# Patient Record
Sex: FEMALE | Race: OTHER | URBAN - METROPOLITAN AREA
[De-identification: names, ages, dates, MRNs, and addresses within clinical notes are randomized per-mention and may not be internally consistent; named-entity substitution may affect disease eponyms.]

---

## 2020-01-10 LAB
ALBUMIN SERPL-MCNC: NORMAL G/DL
ALP BLD-CCNC: NORMAL U/L
ALT SERPL-CCNC: NORMAL U/L
ANION GAP SERPL CALCULATED.3IONS-SCNC: NORMAL MMOL/L
AST SERPL-CCNC: NORMAL U/L
BILIRUB SERPL-MCNC: NORMAL MG/DL
BUN BLDV-MCNC: NORMAL MG/DL
CALCIUM SERPL-MCNC: NORMAL MG/DL
CHLORIDE BLD-SCNC: NORMAL MMOL/L
CHOLESTEROL, TOTAL: NORMAL
CHOLESTEROL/HDL RATIO: NORMAL
CO2: NORMAL
CREAT SERPL-MCNC: NORMAL MG/DL
GFR CALCULATED: NORMAL
GLUCOSE BLD-MCNC: NORMAL MG/DL
HDLC SERPL-MCNC: NORMAL MG/DL
LDL CHOLESTEROL CALCULATED: NORMAL
NONHDLC SERPL-MCNC: NORMAL MG/DL
POTASSIUM SERPL-SCNC: NORMAL MMOL/L
SODIUM BLD-SCNC: NORMAL MMOL/L
TOTAL PROTEIN: NORMAL
TRIGL SERPL-MCNC: NORMAL MG/DL
TSH SERPL DL<=0.05 MIU/L-ACNC: NORMAL M[IU]/L
VITAMIN D 25-HYDROXY: NORMAL
VITAMIN D2, 25 HYDROXY: NORMAL
VITAMIN D3,25 HYDROXY: NORMAL
VLDLC SERPL CALC-MCNC: NORMAL MG/DL

## 2020-10-09 LAB — PAP SMEAR, EXTERNAL: NORMAL

## 2022-09-19 RX ORDER — ETONOGESTREL AND ETHINYL ESTRADIOL 11.7; 2.7 MG/1; MG/1
1 INSERT, EXTENDED RELEASE VAGINAL SEE ADMIN INSTRUCTIONS
COMMUNITY
End: 2022-09-27

## 2022-09-19 RX ORDER — IBUPROFEN 800 MG/1
800 TABLET ORAL 3 TIMES DAILY PRN
COMMUNITY

## 2022-09-25 PROBLEM — F90.2 ATTENTION DEFICIT HYPERACTIVITY DISORDER (ADHD), COMBINED TYPE: Status: ACTIVE | Noted: 2022-09-25

## 2022-09-26 NOTE — PROGRESS NOTES
Tonia Lambert presents today for follow up of ADHD    Current Outpatient Medications   Medication Sig Dispense Refill    amphetamine-dextroamphetamine (ADDERALL, 20MG,) 20 MG tablet Take 1 tablet by mouth in the morning and 1 tablet in the evening. Do all this for 30 days. 60 tablet 0    ibuprofen (ADVIL;MOTRIN) 800 MG tablet Take 800 mg by mouth 3 times daily as needed for Pain       No current facility-administered medications for this visit. Past Medical History:   Diagnosis Date    ADD (attention deficit disorder)           Subjective:  AS above. Feels great, very busy, stays focused is able to multitask and complete everything. No side effects. Review of Systems   Constitutional:  Negative for activity change, appetite change and chills. HENT:  Negative for congestion, ear pain, mouth sores, postnasal drip, rhinorrhea, sinus pressure, sneezing, sore throat and trouble swallowing. Eyes:  Negative for visual disturbance. Cardiovascular:  Negative for chest pain, palpitations and leg swelling. Gastrointestinal:  Negative for abdominal distention, abdominal pain, blood in stool, constipation, diarrhea, nausea and vomiting. Endocrine: Negative for cold intolerance, heat intolerance, polydipsia and polyuria. Genitourinary:  Negative for difficulty urinating, dysuria, flank pain, frequency and urgency. Musculoskeletal:  Negative for arthralgias, back pain, gait problem, neck pain and neck stiffness. Skin: Negative. Negative for color change. Allergic/Immunologic: Negative. Neurological:  Negative for dizziness, tremors, speech difficulty, weakness, light-headedness and headaches. Hematological: Negative. Psychiatric/Behavioral: Negative.           Objective:  /80 (Site: Left Upper Arm, Position: Sitting, Cuff Size: Medium Adult)   Temp 97.7 °F (36.5 °C)   Ht 5' (1.524 m)   Wt 132 lb (59.9 kg)   LMP 09/14/2022 (Exact Date)   BMI 25.78 kg/m²      Physical Exam  HENT: Head: Normocephalic. Right Ear: Tympanic membrane and external ear normal. There is no impacted cerumen. Left Ear: Tympanic membrane and external ear normal. There is no impacted cerumen. Nose: Nose normal.      Mouth/Throat:      Pharynx: Oropharynx is clear. No oropharyngeal exudate. Eyes:      Extraocular Movements: Extraocular movements intact. Conjunctiva/sclera: Conjunctivae normal.      Pupils: Pupils are equal, round, and reactive to light. Cardiovascular:      Rate and Rhythm: Normal rate and regular rhythm. Heart sounds: No murmur heard. No friction rub. No gallop. Pulmonary:      Effort: Pulmonary effort is normal.      Breath sounds: Normal breath sounds. Abdominal:      General: Bowel sounds are normal. There is no distension. Palpations: Abdomen is soft. There is no mass. Tenderness: There is no abdominal tenderness. There is no guarding or rebound. Musculoskeletal:         General: No swelling, tenderness or deformity. Normal range of motion. Cervical back: Normal range of motion and neck supple. No tenderness. Lymphadenopathy:      Cervical: No cervical adenopathy. Skin:     General: Skin is warm. Coloration: Skin is not pale. Findings: No rash. Neurological:      General: No focal deficit present. Mental Status: She is alert and oriented to person, place, and time. Assessment:  Roland Adam was seen today for adhd. Diagnoses and all orders for this visit:    Attention deficit hyperactivity disorder (ADHD), unspecified ADHD type  Comments:  Stable on Adderall   Orders:  -     amphetamine-dextroamphetamine (ADDERALL, 20MG,) 20 MG tablet; Take 1 tablet by mouth in the morning and 1 tablet in the evening. Do all this for 30 days.

## 2022-09-27 ENCOUNTER — OFFICE VISIT (OUTPATIENT)
Dept: PRIMARY CARE CLINIC | Age: 37
End: 2022-09-27
Payer: COMMERCIAL

## 2022-09-27 VITALS
SYSTOLIC BLOOD PRESSURE: 118 MMHG | DIASTOLIC BLOOD PRESSURE: 80 MMHG | HEIGHT: 60 IN | TEMPERATURE: 97.7 F | BODY MASS INDEX: 25.91 KG/M2 | WEIGHT: 132 LBS

## 2022-09-27 DIAGNOSIS — F90.9 ATTENTION DEFICIT HYPERACTIVITY DISORDER (ADHD), UNSPECIFIED ADHD TYPE: Primary | ICD-10-CM

## 2022-09-27 PROCEDURE — 99213 OFFICE O/P EST LOW 20 MIN: CPT | Performed by: INTERNAL MEDICINE

## 2022-09-27 RX ORDER — DEXTROAMPHETAMINE SACCHARATE, AMPHETAMINE ASPARTATE, DEXTROAMPHETAMINE SULFATE AND AMPHETAMINE SULFATE 5; 5; 5; 5 MG/1; MG/1; MG/1; MG/1
20 TABLET ORAL 2 TIMES DAILY
Qty: 60 TABLET | Refills: 0 | Status: SHIPPED | OUTPATIENT
Start: 2022-09-27 | End: 2022-10-27

## 2022-09-27 SDOH — ECONOMIC STABILITY: FOOD INSECURITY: WITHIN THE PAST 12 MONTHS, YOU WORRIED THAT YOUR FOOD WOULD RUN OUT BEFORE YOU GOT MONEY TO BUY MORE.: NEVER TRUE

## 2022-09-27 SDOH — ECONOMIC STABILITY: FOOD INSECURITY: WITHIN THE PAST 12 MONTHS, THE FOOD YOU BOUGHT JUST DIDN'T LAST AND YOU DIDN'T HAVE MONEY TO GET MORE.: NEVER TRUE

## 2022-09-27 ASSESSMENT — ENCOUNTER SYMPTOMS
BLOOD IN STOOL: 0
COLOR CHANGE: 0
BACK PAIN: 0
RHINORRHEA: 0
ABDOMINAL PAIN: 0
NAUSEA: 0
ALLERGIC/IMMUNOLOGIC NEGATIVE: 1
SINUS PRESSURE: 0
SORE THROAT: 0
CONSTIPATION: 0
DIARRHEA: 0
TROUBLE SWALLOWING: 0
VOMITING: 0
ABDOMINAL DISTENTION: 0

## 2022-09-27 ASSESSMENT — PATIENT HEALTH QUESTIONNAIRE - PHQ9
SUM OF ALL RESPONSES TO PHQ QUESTIONS 1-9: 0
SUM OF ALL RESPONSES TO PHQ QUESTIONS 1-9: 0
1. LITTLE INTEREST OR PLEASURE IN DOING THINGS: 0
SUM OF ALL RESPONSES TO PHQ QUESTIONS 1-9: 0
SUM OF ALL RESPONSES TO PHQ9 QUESTIONS 1 & 2: 0
2. FEELING DOWN, DEPRESSED OR HOPELESS: 0
SUM OF ALL RESPONSES TO PHQ QUESTIONS 1-9: 0

## 2022-09-27 ASSESSMENT — SOCIAL DETERMINANTS OF HEALTH (SDOH): HOW HARD IS IT FOR YOU TO PAY FOR THE VERY BASICS LIKE FOOD, HOUSING, MEDICAL CARE, AND HEATING?: NOT HARD AT ALL

## 2022-11-11 ENCOUNTER — OFFICE VISIT (OUTPATIENT)
Dept: PRIMARY CARE CLINIC | Age: 37
End: 2022-11-11
Payer: COMMERCIAL

## 2022-11-11 VITALS
BODY MASS INDEX: 25.91 KG/M2 | OXYGEN SATURATION: 98 % | HEIGHT: 60 IN | SYSTOLIC BLOOD PRESSURE: 102 MMHG | DIASTOLIC BLOOD PRESSURE: 68 MMHG | WEIGHT: 132 LBS | TEMPERATURE: 98 F | HEART RATE: 81 BPM

## 2022-11-11 DIAGNOSIS — F90.9 ATTENTION DEFICIT HYPERACTIVITY DISORDER (ADHD), UNSPECIFIED ADHD TYPE: ICD-10-CM

## 2022-11-11 PROCEDURE — G8427 DOCREV CUR MEDS BY ELIG CLIN: HCPCS | Performed by: INTERNAL MEDICINE

## 2022-11-11 PROCEDURE — 99213 OFFICE O/P EST LOW 20 MIN: CPT | Performed by: INTERNAL MEDICINE

## 2022-11-11 PROCEDURE — G8484 FLU IMMUNIZE NO ADMIN: HCPCS | Performed by: INTERNAL MEDICINE

## 2022-11-11 PROCEDURE — G8419 CALC BMI OUT NRM PARAM NOF/U: HCPCS | Performed by: INTERNAL MEDICINE

## 2022-11-11 PROCEDURE — 1036F TOBACCO NON-USER: CPT | Performed by: INTERNAL MEDICINE

## 2022-11-11 RX ORDER — IBUPROFEN 800 MG/1
800 TABLET ORAL 3 TIMES DAILY PRN
Qty: 120 TABLET | Refills: 0 | Status: SHIPPED | OUTPATIENT
Start: 2022-11-11

## 2022-11-11 RX ORDER — DEXTROAMPHETAMINE SACCHARATE, AMPHETAMINE ASPARTATE, DEXTROAMPHETAMINE SULFATE AND AMPHETAMINE SULFATE 5; 5; 5; 5 MG/1; MG/1; MG/1; MG/1
20 TABLET ORAL 2 TIMES DAILY
Qty: 60 TABLET | Refills: 0 | Status: SHIPPED | OUTPATIENT
Start: 2022-11-11 | End: 2022-12-11

## 2022-11-14 ASSESSMENT — ENCOUNTER SYMPTOMS
SINUS PRESSURE: 0
DIARRHEA: 0
ABDOMINAL PAIN: 0
COLOR CHANGE: 0
NAUSEA: 0
ALLERGIC/IMMUNOLOGIC NEGATIVE: 1
TROUBLE SWALLOWING: 0
SORE THROAT: 0
ABDOMINAL DISTENTION: 0
VOMITING: 0
BACK PAIN: 0
CONSTIPATION: 0
BLOOD IN STOOL: 0
RHINORRHEA: 0

## 2022-11-14 NOTE — PROGRESS NOTES
Maria G White presents today for follow up of ADHD    Current Outpatient Medications   Medication Sig Dispense Refill    amphetamine-dextroamphetamine (ADDERALL, 20MG,) 20 MG tablet Take 1 tablet by mouth in the morning and 1 tablet in the evening. Do all this for 30 days. 60 tablet 0    ibuprofen (ADVIL;MOTRIN) 800 MG tablet Take 1 tablet by mouth 3 times daily as needed for Pain 120 tablet 0     No current facility-administered medications for this visit. Past Medical History:   Diagnosis Date    ADD (attention deficit disorder)           Subjective:  AS above. Continues very busy, has a couple of jobs, though not long term, feels content for now. Still has to decide what to do next. Takes Adderall twice a day every day. No side effects. Allows her to stay focus, multi task and complete tasks without anxiety. Would like to continue on same dose. ADHD  Pertinent negatives include no abdominal pain, arthralgias, chest pain, chills, congestion, headaches, nausea, neck pain, sore throat, vomiting or weakness. Ankle Pain     Knee Pain      Review of Systems   Constitutional:  Negative for activity change, appetite change and chills. HENT:  Negative for congestion, ear pain, mouth sores, postnasal drip, rhinorrhea, sinus pressure, sneezing, sore throat and trouble swallowing. Eyes:  Negative for visual disturbance. Cardiovascular:  Negative for chest pain, palpitations and leg swelling. Gastrointestinal:  Negative for abdominal distention, abdominal pain, blood in stool, constipation, diarrhea, nausea and vomiting. Endocrine: Negative for cold intolerance, heat intolerance, polydipsia and polyuria. Genitourinary:  Negative for difficulty urinating, dysuria, flank pain, frequency and urgency. Musculoskeletal:  Negative for arthralgias, back pain, gait problem, neck pain and neck stiffness. Skin: Negative. Negative for color change. Allergic/Immunologic: Negative.     Neurological: Negative for dizziness, tremors, speech difficulty, weakness, light-headedness and headaches. Hematological: Negative. Psychiatric/Behavioral: Negative. Objective:  /68 (Site: Left Upper Arm, Position: Sitting, Cuff Size: Medium Adult)   Pulse 81   Temp 98 °F (36.7 °C)   Ht 5' (1.524 m)   Wt 132 lb (59.9 kg)   LMP 11/05/2022 (Exact Date)   SpO2 98%   BMI 25.78 kg/m²      Physical Exam  HENT:      Head: Normocephalic. Right Ear: Tympanic membrane and external ear normal. There is no impacted cerumen. Left Ear: Tympanic membrane and external ear normal. There is no impacted cerumen. Nose: Nose normal.      Mouth/Throat:      Pharynx: Oropharynx is clear. No oropharyngeal exudate. Eyes:      Extraocular Movements: Extraocular movements intact. Conjunctiva/sclera: Conjunctivae normal.      Pupils: Pupils are equal, round, and reactive to light. Cardiovascular:      Rate and Rhythm: Normal rate and regular rhythm. Heart sounds: No murmur heard. No friction rub. No gallop. Pulmonary:      Effort: Pulmonary effort is normal.      Breath sounds: Normal breath sounds. Abdominal:      General: Bowel sounds are normal. There is no distension. Palpations: Abdomen is soft. There is no mass. Tenderness: There is no abdominal tenderness. There is no guarding or rebound. Musculoskeletal:         General: No swelling, tenderness or deformity. Normal range of motion. Cervical back: Normal range of motion and neck supple. No tenderness. Lymphadenopathy:      Cervical: No cervical adenopathy. Skin:     General: Skin is warm. Coloration: Skin is not pale. Findings: No rash. Neurological:      General: No focal deficit present. Mental Status: She is alert and oriented to person, place, and time. Assessment:  Magui Ha was seen today for adhd, ankle pain and knee pain.     Diagnoses and all orders for this visit:    Attention deficit hyperactivity disorder (ADHD), unspecified ADHD type  Comments:  Stable on Adderall with no side effects  Orders:  -     amphetamine-dextroamphetamine (ADDERALL, 20MG,) 20 MG tablet; Take 1 tablet by mouth in the morning and 1 tablet in the evening. Do all this for 30 days. Other orders  -     ibuprofen (ADVIL;MOTRIN) 800 MG tablet;  Take 1 tablet by mouth 3 times daily as needed for Pain

## 2022-12-05 ENCOUNTER — TELEPHONE (OUTPATIENT)
Dept: PRIMARY CARE CLINIC | Age: 37
End: 2022-12-05

## 2022-12-05 DIAGNOSIS — J06.9 URTI (ACUTE UPPER RESPIRATORY INFECTION): Primary | ICD-10-CM

## 2022-12-05 RX ORDER — AZITHROMYCIN 1 G
1 PACKET (EA) ORAL ONCE
Qty: 1 EACH | Refills: 0 | Status: SHIPPED | OUTPATIENT
Start: 2022-12-05 | End: 2022-12-05

## 2022-12-05 RX ORDER — AZITHROMYCIN 1 G
1 PACKET (EA) ORAL ONCE
COMMUNITY
End: 2022-12-05 | Stop reason: SDUPTHER

## 2022-12-09 ENCOUNTER — OFFICE VISIT (OUTPATIENT)
Dept: PRIMARY CARE CLINIC | Age: 37
End: 2022-12-09
Payer: COMMERCIAL

## 2022-12-09 VITALS
BODY MASS INDEX: 25.72 KG/M2 | DIASTOLIC BLOOD PRESSURE: 68 MMHG | WEIGHT: 131 LBS | HEIGHT: 60 IN | TEMPERATURE: 98.6 F | OXYGEN SATURATION: 99 % | HEART RATE: 80 BPM | SYSTOLIC BLOOD PRESSURE: 112 MMHG

## 2022-12-09 DIAGNOSIS — F90.9 ATTENTION DEFICIT HYPERACTIVITY DISORDER (ADHD), UNSPECIFIED ADHD TYPE: Primary | ICD-10-CM

## 2022-12-09 DIAGNOSIS — J06.9 URTI (ACUTE UPPER RESPIRATORY INFECTION): ICD-10-CM

## 2022-12-09 DIAGNOSIS — L03.032 INFECTION OF NAIL BED OF TOE OF LEFT FOOT: ICD-10-CM

## 2022-12-09 PROCEDURE — 1036F TOBACCO NON-USER: CPT | Performed by: INTERNAL MEDICINE

## 2022-12-09 PROCEDURE — G8419 CALC BMI OUT NRM PARAM NOF/U: HCPCS | Performed by: INTERNAL MEDICINE

## 2022-12-09 PROCEDURE — G8484 FLU IMMUNIZE NO ADMIN: HCPCS | Performed by: INTERNAL MEDICINE

## 2022-12-09 PROCEDURE — 99214 OFFICE O/P EST MOD 30 MIN: CPT | Performed by: INTERNAL MEDICINE

## 2022-12-09 PROCEDURE — G8427 DOCREV CUR MEDS BY ELIG CLIN: HCPCS | Performed by: INTERNAL MEDICINE

## 2022-12-09 RX ORDER — DEXTROAMPHETAMINE SACCHARATE, AMPHETAMINE ASPARTATE, DEXTROAMPHETAMINE SULFATE AND AMPHETAMINE SULFATE 5; 5; 5; 5 MG/1; MG/1; MG/1; MG/1
20 TABLET ORAL 2 TIMES DAILY
Qty: 60 TABLET | Refills: 0 | Status: SHIPPED | OUTPATIENT
Start: 2022-12-09 | End: 2023-01-08

## 2022-12-09 RX ORDER — CEPHALEXIN 250 MG/1
250 CAPSULE ORAL 3 TIMES DAILY
Qty: 21 CAPSULE | Refills: 0 | Status: SHIPPED | OUTPATIENT
Start: 2022-12-09

## 2022-12-09 ASSESSMENT — ENCOUNTER SYMPTOMS
SORE THROAT: 1
BLOOD IN STOOL: 0
NAUSEA: 0
ALLERGIC/IMMUNOLOGIC NEGATIVE: 1
ABDOMINAL DISTENTION: 0
COLOR CHANGE: 0
ABDOMINAL PAIN: 0
SINUS PRESSURE: 0
DIARRHEA: 0
TROUBLE SWALLOWING: 0
CONSTIPATION: 0
VOMITING: 0
RHINORRHEA: 0
BACK PAIN: 0

## 2022-12-09 NOTE — PROGRESS NOTES
Lovina Claude presents today for follow of URTI, thinks has a toe infection and ADHD    Current Outpatient Medications   Medication Sig Dispense Refill    amphetamine-dextroamphetamine (ADDERALL, 20MG,) 20 MG tablet Take 1 tablet by mouth in the morning and 1 tablet in the evening. Do all this for 30 days. 60 tablet 0    cephALEXin (KEFLEX) 250 MG capsule Take 1 capsule by mouth 3 times daily 21 capsule 0    ibuprofen (ADVIL;MOTRIN) 800 MG tablet Take 1 tablet by mouth 3 times daily as needed for Pain 120 tablet 0     No current facility-administered medications for this visit. Past Medical History:   Diagnosis Date    ADD (attention deficit disorder)           Subjective:  As above. Takes Adderall every day, no side effects. Functions great, able to multitask and stay focused. Sore throat, just took Zithromax, starting to feel better, wants  her throat checked. No fever or cough, checked herself for COVID, neg. Pulled shin next to her big toe nail, now is swollen and seems infected. ADHD  Associated symptoms include a sore throat. Pertinent negatives include no abdominal pain, arthralgias, chest pain, chills, congestion, headaches, nausea, neck pain, vomiting or weakness. Foot Swelling      Review of Systems   Constitutional:  Negative for activity change, appetite change and chills. HENT:  Positive for sore throat. Negative for congestion, ear pain, mouth sores, postnasal drip, rhinorrhea, sinus pressure, sneezing and trouble swallowing. Eyes:  Negative for visual disturbance. Cardiovascular:  Negative for chest pain, palpitations and leg swelling. Gastrointestinal:  Negative for abdominal distention, abdominal pain, blood in stool, constipation, diarrhea, nausea and vomiting. Endocrine: Negative for cold intolerance, heat intolerance, polydipsia and polyuria. Genitourinary:  Negative for difficulty urinating, dysuria, flank pain, frequency and urgency.    Musculoskeletal:  Negative for arthralgias, back pain, gait problem, neck pain and neck stiffness. Painful left big toe. Skin: Negative. Negative for color change. Allergic/Immunologic: Negative. Neurological:  Negative for dizziness, tremors, speech difficulty, weakness, light-headedness and headaches. Hematological: Negative. Psychiatric/Behavioral: Negative. Objective:  /68 (Site: Left Upper Arm, Position: Sitting, Cuff Size: Medium Adult)   Pulse 80   Temp 98.6 °F (37 °C)   Ht 5' (1.524 m)   Wt 131 lb (59.4 kg)   LMP 12/01/2022 (Exact Date)   SpO2 99%   BMI 25.58 kg/m²      Physical Exam     Assessment:  Odilon Lopez was seen today for adhd and foot swelling. Diagnoses and all orders for this visit:    Attention deficit hyperactivity disorder (ADHD), unspecified ADHD type  Comments:  Stable on Adderall with no side effects  Orders:  -     amphetamine-dextroamphetamine (ADDERALL, 20MG,) 20 MG tablet; Take 1 tablet by mouth in the morning and 1 tablet in the evening. Do all this for 30 days. URTI (acute upper respiratory infection)  Comments:  REsponing to Zithromax    Infection of nail bed of toe of left foot  Comments:  Soak foot in hot water and Peroxide, Keflex 200 tid for 7 days    Other orders  -     cephALEXin (KEFLEX) 250 MG capsule;  Take 1 capsule by mouth 3 times daily

## 2023-01-17 ENCOUNTER — OFFICE VISIT (OUTPATIENT)
Dept: PRIMARY CARE CLINIC | Age: 38
End: 2023-01-17
Payer: COMMERCIAL

## 2023-01-17 VITALS
TEMPERATURE: 99.1 F | BODY MASS INDEX: 26.31 KG/M2 | HEART RATE: 92 BPM | OXYGEN SATURATION: 98 % | HEIGHT: 60 IN | DIASTOLIC BLOOD PRESSURE: 70 MMHG | WEIGHT: 134 LBS | SYSTOLIC BLOOD PRESSURE: 104 MMHG

## 2023-01-17 DIAGNOSIS — F90.9 ATTENTION DEFICIT HYPERACTIVITY DISORDER (ADHD), UNSPECIFIED ADHD TYPE: ICD-10-CM

## 2023-01-17 PROCEDURE — G8484 FLU IMMUNIZE NO ADMIN: HCPCS | Performed by: INTERNAL MEDICINE

## 2023-01-17 PROCEDURE — G8419 CALC BMI OUT NRM PARAM NOF/U: HCPCS | Performed by: INTERNAL MEDICINE

## 2023-01-17 PROCEDURE — 1036F TOBACCO NON-USER: CPT | Performed by: INTERNAL MEDICINE

## 2023-01-17 PROCEDURE — 99213 OFFICE O/P EST LOW 20 MIN: CPT | Performed by: INTERNAL MEDICINE

## 2023-01-17 PROCEDURE — G8427 DOCREV CUR MEDS BY ELIG CLIN: HCPCS | Performed by: INTERNAL MEDICINE

## 2023-01-17 RX ORDER — DEXTROAMPHETAMINE SACCHARATE, AMPHETAMINE ASPARTATE, DEXTROAMPHETAMINE SULFATE AND AMPHETAMINE SULFATE 5; 5; 5; 5 MG/1; MG/1; MG/1; MG/1
20 TABLET ORAL 2 TIMES DAILY
Qty: 60 TABLET | Refills: 0 | Status: SHIPPED | OUTPATIENT
Start: 2023-01-17 | End: 2023-02-16

## 2023-01-17 ASSESSMENT — PATIENT HEALTH QUESTIONNAIRE - PHQ9
1. LITTLE INTEREST OR PLEASURE IN DOING THINGS: 0
SUM OF ALL RESPONSES TO PHQ QUESTIONS 1-9: 0
SUM OF ALL RESPONSES TO PHQ9 QUESTIONS 1 & 2: 0
SUM OF ALL RESPONSES TO PHQ QUESTIONS 1-9: 0
SUM OF ALL RESPONSES TO PHQ QUESTIONS 1-9: 0
2. FEELING DOWN, DEPRESSED OR HOPELESS: 0
SUM OF ALL RESPONSES TO PHQ QUESTIONS 1-9: 0

## 2023-01-17 ASSESSMENT — ENCOUNTER SYMPTOMS
TROUBLE SWALLOWING: 0
ABDOMINAL PAIN: 0
ABDOMINAL DISTENTION: 0
NAUSEA: 0
BLOOD IN STOOL: 0
CONSTIPATION: 0
COLOR CHANGE: 0
SORE THROAT: 0
BACK PAIN: 0
ALLERGIC/IMMUNOLOGIC NEGATIVE: 1
SINUS PRESSURE: 0
DIARRHEA: 0
RHINORRHEA: 0
VOMITING: 0

## 2023-01-17 NOTE — PROGRESS NOTES
Jennifer Maddox presents today for follow up of ADHD    Current Outpatient Medications   Medication Sig Dispense Refill    amphetamine-dextroamphetamine (ADDERALL, 20MG,) 20 MG tablet Take 1 tablet by mouth in the morning and 1 tablet in the evening. Do all this for 30 days. 60 tablet 0    ibuprofen (ADVIL;MOTRIN) 800 MG tablet Take 1 tablet by mouth 3 times daily as needed for Pain 120 tablet 0    cephALEXin (KEFLEX) 250 MG capsule Take 1 capsule by mouth 3 times daily (Patient not taking: Reported on 1/17/2023) 21 capsule 0     No current facility-administered medications for this visit. Past Medical History:   Diagnosis Date    ADD (attention deficit disorder)           Subjective:  AS above, takes Adderall religiously twice a day. Tolerates well. As long as she takes it is able to focus, multitask, and complete tasks. ADHD  Pertinent negatives include no abdominal pain, arthralgias, chest pain, chills, congestion, headaches, nausea, neck pain, sore throat, vomiting or weakness. Review of Systems   Constitutional:  Negative for activity change, appetite change and chills. HENT:  Negative for congestion, ear pain, mouth sores, postnasal drip, rhinorrhea, sinus pressure, sneezing, sore throat and trouble swallowing. Eyes:  Negative for visual disturbance. Cardiovascular:  Negative for chest pain, palpitations and leg swelling. Gastrointestinal:  Negative for abdominal distention, abdominal pain, blood in stool, constipation, diarrhea, nausea and vomiting. Endocrine: Negative for cold intolerance, heat intolerance, polydipsia and polyuria. Genitourinary:  Negative for difficulty urinating, dysuria, flank pain, frequency and urgency. Musculoskeletal:  Negative for arthralgias, back pain, gait problem, neck pain and neck stiffness. Skin: Negative. Negative for color change. Allergic/Immunologic: Negative.     Neurological:  Negative for dizziness, tremors, speech difficulty, weakness, light-headedness and headaches. Hematological: Negative. Psychiatric/Behavioral: Negative. Objective:  /70 (Site: Left Upper Arm, Position: Sitting, Cuff Size: Medium Adult)   Pulse 92   Temp 99.1 °F (37.3 °C)   Ht 5' (1.524 m)   Wt 134 lb (60.8 kg)   LMP 12/22/2022 (Approximate)   SpO2 98%   BMI 26.17 kg/m²      Physical Exam  HENT:      Head: Normocephalic. Right Ear: Tympanic membrane and external ear normal. There is no impacted cerumen. Left Ear: Tympanic membrane and external ear normal. There is no impacted cerumen. Nose: Nose normal.      Mouth/Throat:      Pharynx: Oropharynx is clear. No oropharyngeal exudate. Eyes:      Extraocular Movements: Extraocular movements intact. Conjunctiva/sclera: Conjunctivae normal.      Pupils: Pupils are equal, round, and reactive to light. Cardiovascular:      Rate and Rhythm: Normal rate and regular rhythm. Heart sounds: No murmur heard. No friction rub. No gallop. Pulmonary:      Effort: Pulmonary effort is normal.      Breath sounds: Normal breath sounds. Abdominal:      General: Bowel sounds are normal. There is no distension. Palpations: Abdomen is soft. There is no mass. Tenderness: There is no abdominal tenderness. There is no guarding or rebound. Musculoskeletal:         General: No swelling, tenderness or deformity. Normal range of motion. Cervical back: Normal range of motion and neck supple. No tenderness. Lymphadenopathy:      Cervical: No cervical adenopathy. Skin:     General: Skin is warm. Coloration: Skin is not pale. Findings: No rash. Neurological:      General: No focal deficit present. Mental Status: She is alert and oriented to person, place, and time. Psychiatric:      Comments: Hyperactivity, lack of focus        Assessment:  Kristin Stratton was seen today for adhd.     Diagnoses and all orders for this visit:    Attention deficit hyperactivity disorder (ADHD), unspecified ADHD type  Comments:  Stable on Adderall with no side effects  Orders:  -     amphetamine-dextroamphetamine (ADDERALL, 20MG,) 20 MG tablet; Take 1 tablet by mouth in the morning and 1 tablet in the evening. Do all this for 30 days.

## 2023-01-27 ENCOUNTER — TELEPHONE (OUTPATIENT)
Dept: PRIMARY CARE CLINIC | Age: 38
End: 2023-01-27

## 2023-01-27 NOTE — TELEPHONE ENCOUNTER
Patient requesting something be sent into [facility] for a ear infection. States her ear is throbbing and hurts. She would like a return call to let her know if something is called in. Thank you.

## 2023-01-29 RX ORDER — AMOXICILLIN AND CLAVULANATE POTASSIUM 875; 125 MG/1; MG/1
1 TABLET, FILM COATED ORAL 2 TIMES DAILY WITH MEALS
Qty: 20 TABLET | Refills: 0 | Status: SHIPPED | OUTPATIENT
Start: 2023-01-29 | End: 2023-02-08

## 2023-02-14 ENCOUNTER — OFFICE VISIT (OUTPATIENT)
Dept: PRIMARY CARE CLINIC | Age: 38
End: 2023-02-14
Payer: COMMERCIAL

## 2023-02-14 VITALS
HEIGHT: 60 IN | HEART RATE: 78 BPM | SYSTOLIC BLOOD PRESSURE: 118 MMHG | DIASTOLIC BLOOD PRESSURE: 78 MMHG | TEMPERATURE: 98 F | WEIGHT: 132 LBS | BODY MASS INDEX: 25.91 KG/M2 | OXYGEN SATURATION: 98 %

## 2023-02-14 DIAGNOSIS — F90.9 ATTENTION DEFICIT HYPERACTIVITY DISORDER (ADHD), UNSPECIFIED ADHD TYPE: ICD-10-CM

## 2023-02-14 DIAGNOSIS — H92.02 OTALGIA OF LEFT EAR: Primary | ICD-10-CM

## 2023-02-14 PROCEDURE — G8427 DOCREV CUR MEDS BY ELIG CLIN: HCPCS | Performed by: INTERNAL MEDICINE

## 2023-02-14 PROCEDURE — 99213 OFFICE O/P EST LOW 20 MIN: CPT | Performed by: INTERNAL MEDICINE

## 2023-02-14 PROCEDURE — 1036F TOBACCO NON-USER: CPT | Performed by: INTERNAL MEDICINE

## 2023-02-14 PROCEDURE — G8419 CALC BMI OUT NRM PARAM NOF/U: HCPCS | Performed by: INTERNAL MEDICINE

## 2023-02-14 PROCEDURE — G8484 FLU IMMUNIZE NO ADMIN: HCPCS | Performed by: INTERNAL MEDICINE

## 2023-02-14 RX ORDER — IBUPROFEN 800 MG/1
800 TABLET ORAL 3 TIMES DAILY PRN
Qty: 120 TABLET | Refills: 0 | Status: SHIPPED | OUTPATIENT
Start: 2023-02-14

## 2023-02-14 RX ORDER — DEXTROAMPHETAMINE SACCHARATE, AMPHETAMINE ASPARTATE, DEXTROAMPHETAMINE SULFATE AND AMPHETAMINE SULFATE 5; 5; 5; 5 MG/1; MG/1; MG/1; MG/1
20 TABLET ORAL 2 TIMES DAILY
Qty: 60 TABLET | Refills: 0 | Status: SHIPPED | OUTPATIENT
Start: 2023-02-14 | End: 2023-03-16

## 2023-02-14 SDOH — ECONOMIC STABILITY: FOOD INSECURITY: WITHIN THE PAST 12 MONTHS, YOU WORRIED THAT YOUR FOOD WOULD RUN OUT BEFORE YOU GOT MONEY TO BUY MORE.: NEVER TRUE

## 2023-02-14 SDOH — ECONOMIC STABILITY: INCOME INSECURITY: HOW HARD IS IT FOR YOU TO PAY FOR THE VERY BASICS LIKE FOOD, HOUSING, MEDICAL CARE, AND HEATING?: NOT HARD AT ALL

## 2023-02-14 SDOH — ECONOMIC STABILITY: FOOD INSECURITY: WITHIN THE PAST 12 MONTHS, THE FOOD YOU BOUGHT JUST DIDN'T LAST AND YOU DIDN'T HAVE MONEY TO GET MORE.: NEVER TRUE

## 2023-02-14 SDOH — ECONOMIC STABILITY: HOUSING INSECURITY
IN THE LAST 12 MONTHS, WAS THERE A TIME WHEN YOU DID NOT HAVE A STEADY PLACE TO SLEEP OR SLEPT IN A SHELTER (INCLUDING NOW)?: NO

## 2023-02-14 ASSESSMENT — ENCOUNTER SYMPTOMS
BACK PAIN: 0
VOMITING: 0
RHINORRHEA: 0
ABDOMINAL DISTENTION: 0
ABDOMINAL PAIN: 0
DIARRHEA: 0
TROUBLE SWALLOWING: 0
BLOOD IN STOOL: 0
SINUS PRESSURE: 0
ALLERGIC/IMMUNOLOGIC NEGATIVE: 1
CONSTIPATION: 0
NAUSEA: 0
SORE THROAT: 0
COLOR CHANGE: 0

## 2023-02-14 NOTE — PROGRESS NOTES
Terrell Minor presents today for follow up of ADHD and left ear ache. Current Outpatient Medications   Medication Sig Dispense Refill    amphetamine-dextroamphetamine (ADDERALL, 20MG,) 20 MG tablet Take 1 tablet by mouth in the morning and 1 tablet in the evening. Do all this for 30 days. 60 tablet 0    ibuprofen (ADVIL;MOTRIN) 800 MG tablet Take 1 tablet by mouth 3 times daily as needed for Pain 120 tablet 0    cephALEXin (KEFLEX) 250 MG capsule Take 1 capsule by mouth 3 times daily (Patient not taking: No sig reported) 21 capsule 0     No current facility-administered medications for this visit. Past Medical History:   Diagnosis Date    ADD (attention deficit disorder)           Subjective:  ADHD very well controlled on Adderall bid, takes it every day, able to focus and multitask without issues. No side effects. [facility], given Augmentin, called in with persistent pain, was given Augmentin again, still very mild residual pain/discomfort but is better. Had tubes in both ears long time ago. Has constant nasal drainage and stuffiness. Always on allergy pills and other otc meds for it. No nasal sprays. ADHD  Pertinent negatives include no abdominal pain, arthralgias, chest pain, chills, congestion, headaches, nausea, neck pain, sore throat, vomiting or weakness. Review of Systems   Constitutional:  Negative for activity change, appetite change and chills. HENT:  Positive for ear pain. Negative for congestion, mouth sores, postnasal drip, rhinorrhea, sinus pressure, sneezing, sore throat and trouble swallowing. Eyes:  Negative for visual disturbance. Cardiovascular:  Negative for chest pain, palpitations and leg swelling. Gastrointestinal:  Negative for abdominal distention, abdominal pain, blood in stool, constipation, diarrhea, nausea and vomiting. Endocrine: Negative for cold intolerance, heat intolerance, polydipsia and polyuria.    Genitourinary: Negative for difficulty urinating, dysuria, flank pain, frequency and urgency. Musculoskeletal:  Negative for arthralgias, back pain, gait problem, neck pain and neck stiffness. Skin: Negative. Negative for color change. Allergic/Immunologic: Negative. Neurological:  Negative for dizziness, tremors, speech difficulty, weakness, light-headedness and headaches. Hematological: Negative. Psychiatric/Behavioral: Negative. Objective:  /78 (Site: Left Upper Arm, Position: Sitting, Cuff Size: Medium Adult)   Pulse 78   Temp 98 °F (36.7 °C)   Ht 5' (1.524 m)   Wt 132 lb (59.9 kg)   LMP 02/12/2023   SpO2 98%   BMI 25.78 kg/m²      Physical Exam  Vitals reviewed. Constitutional:       Appearance: Normal appearance. HENT:      Head: Normocephalic. Right Ear: Tympanic membrane and external ear normal. There is no impacted cerumen. Left Ear: Tympanic membrane and external ear normal. There is no impacted cerumen. Ears:      Comments: Bilateral ear scar tissue, no swelling or redness, membrane is clear. Nose: Nose normal.      Mouth/Throat:      Pharynx: Oropharynx is clear. No oropharyngeal exudate. Eyes:      Extraocular Movements: Extraocular movements intact. Conjunctiva/sclera: Conjunctivae normal.      Pupils: Pupils are equal, round, and reactive to light. Cardiovascular:      Rate and Rhythm: Normal rate and regular rhythm. Heart sounds: No murmur heard. No friction rub. No gallop. Pulmonary:      Effort: Pulmonary effort is normal.      Breath sounds: Normal breath sounds. Abdominal:      General: Bowel sounds are normal. There is no distension. Palpations: Abdomen is soft. There is no mass. Tenderness: There is no abdominal tenderness. There is no guarding or rebound. Musculoskeletal:         General: No swelling, tenderness or deformity. Normal range of motion. Cervical back: Normal range of motion and neck supple.  No tenderness. Lymphadenopathy:      Cervical: No cervical adenopathy. Skin:     General: Skin is warm. Coloration: Skin is not pale. Findings: No rash. Neurological:      General: No focal deficit present. Mental Status: She is alert and oriented to person, place, and time. Assessment:  Terrie Arthur was seen today for adhd. Diagnoses and all orders for this visit:    Otalgia of left ear  Comments:  No signs of infection, finish antibiotic, call if not 110% resolved. Attention deficit hyperactivity disorder (ADHD), unspecified ADHD type  Comments:  Stable on Adderall with no side effects  Orders:  -     amphetamine-dextroamphetamine (ADDERALL, 20MG,) 20 MG tablet; Take 1 tablet by mouth in the morning and 1 tablet in the evening. Do all this for 30 days. Other orders  -     ibuprofen (ADVIL;MOTRIN) 800 MG tablet;  Take 1 tablet by mouth 3 times daily as needed for Pain

## 2023-03-14 ENCOUNTER — OFFICE VISIT (OUTPATIENT)
Dept: PRIMARY CARE CLINIC | Age: 38
End: 2023-03-14
Payer: COMMERCIAL

## 2023-03-14 VITALS
HEIGHT: 60 IN | BODY MASS INDEX: 26.5 KG/M2 | OXYGEN SATURATION: 97 % | WEIGHT: 135 LBS | HEART RATE: 91 BPM | DIASTOLIC BLOOD PRESSURE: 70 MMHG | SYSTOLIC BLOOD PRESSURE: 116 MMHG

## 2023-03-14 DIAGNOSIS — F90.9 ATTENTION DEFICIT HYPERACTIVITY DISORDER (ADHD), UNSPECIFIED ADHD TYPE: ICD-10-CM

## 2023-03-14 PROCEDURE — 99213 OFFICE O/P EST LOW 20 MIN: CPT | Performed by: INTERNAL MEDICINE

## 2023-03-14 PROCEDURE — G8419 CALC BMI OUT NRM PARAM NOF/U: HCPCS | Performed by: INTERNAL MEDICINE

## 2023-03-14 PROCEDURE — 1036F TOBACCO NON-USER: CPT | Performed by: INTERNAL MEDICINE

## 2023-03-14 PROCEDURE — G8427 DOCREV CUR MEDS BY ELIG CLIN: HCPCS | Performed by: INTERNAL MEDICINE

## 2023-03-14 PROCEDURE — G8484 FLU IMMUNIZE NO ADMIN: HCPCS | Performed by: INTERNAL MEDICINE

## 2023-03-14 RX ORDER — DEXTROAMPHETAMINE SACCHARATE, AMPHETAMINE ASPARTATE, DEXTROAMPHETAMINE SULFATE AND AMPHETAMINE SULFATE 5; 5; 5; 5 MG/1; MG/1; MG/1; MG/1
20 TABLET ORAL 2 TIMES DAILY
Qty: 60 TABLET | Refills: 0 | Status: CANCELLED | OUTPATIENT
Start: 2023-03-14 | End: 2023-04-13

## 2023-03-14 RX ORDER — CLINDAMYCIN PHOSPHATE 10 MG/G
1 GEL TOPICAL DAILY
COMMUNITY
Start: 2023-02-18

## 2023-03-14 RX ORDER — BENZOYL PEROXIDE 50 MG/ML
1 LIQUID TOPICAL DAILY
COMMUNITY
Start: 2023-02-20

## 2023-03-14 ASSESSMENT — ENCOUNTER SYMPTOMS
SORE THROAT: 0
COLOR CHANGE: 0
CONSTIPATION: 0
TROUBLE SWALLOWING: 0
DIARRHEA: 0
ALLERGIC/IMMUNOLOGIC NEGATIVE: 1
ABDOMINAL PAIN: 0
SINUS PRESSURE: 0
BLOOD IN STOOL: 0
NAUSEA: 0
RHINORRHEA: 0
BACK PAIN: 0
VOMITING: 0
ABDOMINAL DISTENTION: 0

## 2023-03-14 NOTE — PROGRESS NOTES
Sreekanth Jolly presents today for   Follow up of ADHD  Current Outpatient Medications   Medication Sig Dispense Refill    BENZOYL PEROXIDE 5 % external wash Apply 1 application topically daily      clindamycin (CLINDAGEL) 1 % gel Apply 1 application topically daily      amphetamine-dextroamphetamine (ADDERALL, 20MG,) 20 MG tablet Take 1 tablet by mouth in the morning and 1 tablet in the evening. Do all this for 30 days. 60 tablet 0    ibuprofen (ADVIL;MOTRIN) 800 MG tablet Take 1 tablet by mouth 3 times daily as needed for Pain 120 tablet 0    cephALEXin (KEFLEX) 250 MG capsule Take 1 capsule by mouth 3 times daily (Patient not taking: No sig reported) 21 capsule 0     No current facility-administered medications for this visit. Past Medical History:   Diagnosis Date    ADD (attention deficit disorder)           Subjective:  Pharmacy has been out of Adderall so was not able to get last month's prescription. Feels a great difference. Can't focus, can't sleep, forgetful, feels like her brain is all over the place. ADHD  Pertinent negatives include no abdominal pain, arthralgias, chest pain, chills, congestion, headaches, nausea, neck pain, sore throat, vomiting or weakness. Review of Systems   Constitutional:  Negative for activity change, appetite change and chills. HENT:  Negative for congestion, ear pain, mouth sores, postnasal drip, rhinorrhea, sinus pressure, sneezing, sore throat and trouble swallowing. Eyes:  Negative for visual disturbance. Cardiovascular:  Negative for chest pain, palpitations and leg swelling. Gastrointestinal:  Negative for abdominal distention, abdominal pain, blood in stool, constipation, diarrhea, nausea and vomiting. Endocrine: Negative for cold intolerance, heat intolerance, polydipsia and polyuria. Genitourinary:  Positive for menstrual problem. Negative for difficulty urinating, dysuria, flank pain, frequency and urgency.    Musculoskeletal:  Negative for arthralgias, back pain, gait problem, neck pain and neck stiffness. Skin: Negative. Negative for color change. Allergic/Immunologic: Negative. Neurological:  Negative for dizziness, tremors, speech difficulty, weakness, light-headedness and headaches. Hematological: Negative. Psychiatric/Behavioral: Negative. Inatentive, lack of focus, insomnia        Objective:  /70 (Site: Left Upper Arm, Position: Sitting, Cuff Size: Medium Adult)   Pulse 91   Ht 5' (1.524 m)   Wt 135 lb (61.2 kg)   LMP 03/07/2023 (Exact Date)   SpO2 97%   BMI 26.37 kg/m²      Physical Exam  Constitutional:       Comments: Looks tired   HENT:      Head: Normocephalic. Right Ear: Tympanic membrane and external ear normal. There is no impacted cerumen. Left Ear: Tympanic membrane and external ear normal. There is no impacted cerumen. Nose: Nose normal.      Mouth/Throat:      Pharynx: Oropharynx is clear. No oropharyngeal exudate. Eyes:      Extraocular Movements: Extraocular movements intact. Conjunctiva/sclera: Conjunctivae normal.      Pupils: Pupils are equal, round, and reactive to light. Cardiovascular:      Rate and Rhythm: Normal rate and regular rhythm. Heart sounds: No murmur heard. No friction rub. No gallop. Pulmonary:      Effort: Pulmonary effort is normal.      Breath sounds: Normal breath sounds. Abdominal:      General: Bowel sounds are normal. There is no distension. Palpations: Abdomen is soft. There is no mass. Tenderness: There is no abdominal tenderness. There is no guarding or rebound. Musculoskeletal:         General: No swelling, tenderness or deformity. Normal range of motion. Cervical back: Normal range of motion and neck supple. No tenderness. Lymphadenopathy:      Cervical: No cervical adenopathy. Skin:     General: Skin is warm. Coloration: Skin is not pale. Findings: No rash.    Neurological:      General: No focal deficit present. Mental Status: She is alert and oriented to person, place, and time. Assessment:  Letty Blanco was seen today for adhd. Diagnoses and all orders for this visit:    Attention deficit hyperactivity disorder (ADHD), unspecified ADHD type  Comments:  Has not have Adderall for a month, all sxs of ADHD are back.   Gave written prescription instead of sending it electronically so she can go to several pharmacies

## 2023-04-04 NOTE — TELEPHONE ENCOUNTER
Pt requested antibiotic for her toe. She cut it too short and it is red, painful, and leaking pus. She has done this before and was told to soak it- and received an antibiotic for it.   Please send to [facility] [phone]

## 2023-04-05 RX ORDER — CEPHALEXIN 250 MG/1
250 CAPSULE ORAL 3 TIMES DAILY
Qty: 21 CAPSULE | Refills: 0 | Status: SHIPPED | OUTPATIENT
Start: 2023-04-05 | End: 2023-04-12

## 2023-05-16 ENCOUNTER — OFFICE VISIT (OUTPATIENT)
Dept: PRIMARY CARE CLINIC | Age: 38
End: 2023-05-16
Payer: COMMERCIAL

## 2023-05-16 VITALS
OXYGEN SATURATION: 98 % | WEIGHT: 131 LBS | HEIGHT: 60 IN | SYSTOLIC BLOOD PRESSURE: 112 MMHG | HEART RATE: 82 BPM | DIASTOLIC BLOOD PRESSURE: 76 MMHG | TEMPERATURE: 98 F | BODY MASS INDEX: 25.72 KG/M2

## 2023-05-16 DIAGNOSIS — J30.9 ALLERGIC RHINITIS, UNSPECIFIED SEASONALITY, UNSPECIFIED TRIGGER: Primary | ICD-10-CM

## 2023-05-16 DIAGNOSIS — F90.9 ATTENTION DEFICIT HYPERACTIVITY DISORDER (ADHD), UNSPECIFIED ADHD TYPE: ICD-10-CM

## 2023-05-16 PROCEDURE — 1036F TOBACCO NON-USER: CPT | Performed by: INTERNAL MEDICINE

## 2023-05-16 PROCEDURE — G8419 CALC BMI OUT NRM PARAM NOF/U: HCPCS | Performed by: INTERNAL MEDICINE

## 2023-05-16 PROCEDURE — 99213 OFFICE O/P EST LOW 20 MIN: CPT | Performed by: INTERNAL MEDICINE

## 2023-05-16 PROCEDURE — G8427 DOCREV CUR MEDS BY ELIG CLIN: HCPCS | Performed by: INTERNAL MEDICINE

## 2023-05-16 RX ORDER — LORATADINE 10 MG/1
10 TABLET ORAL DAILY
Qty: 30 TABLET | Refills: 2 | Status: SHIPPED | OUTPATIENT
Start: 2023-05-16

## 2023-05-16 RX ORDER — IBUPROFEN 800 MG/1
800 TABLET ORAL 3 TIMES DAILY PRN
Qty: 120 TABLET | Refills: 0 | Status: SHIPPED | OUTPATIENT
Start: 2023-05-16

## 2023-05-16 RX ORDER — DEXTROAMPHETAMINE SACCHARATE, AMPHETAMINE ASPARTATE, DEXTROAMPHETAMINE SULFATE AND AMPHETAMINE SULFATE 5; 5; 5; 5 MG/1; MG/1; MG/1; MG/1
20 TABLET ORAL 2 TIMES DAILY
Qty: 60 TABLET | Refills: 0 | Status: SHIPPED | OUTPATIENT
Start: 2023-05-16 | End: 2023-06-15

## 2023-05-16 ASSESSMENT — ENCOUNTER SYMPTOMS
COLOR CHANGE: 0
NAUSEA: 0
ABDOMINAL DISTENTION: 0
BLOOD IN STOOL: 0
SORE THROAT: 0
TROUBLE SWALLOWING: 0
BACK PAIN: 0
ALLERGIC/IMMUNOLOGIC NEGATIVE: 1
DIARRHEA: 0
VOMITING: 0
SINUS PRESSURE: 0
RHINORRHEA: 0
ABDOMINAL PAIN: 0
CONSTIPATION: 0

## 2023-05-16 NOTE — PROGRESS NOTES
Soren Lofton presents today for     Current Outpatient Medications   Medication Sig Dispense Refill    amphetamine-dextroamphetamine (ADDERALL, 20MG,) 20 MG tablet Take 1 tablet by mouth in the morning and 1 tablet in the evening. Do all this for 30 days. 60 tablet 0    BENZOYL PEROXIDE 5 % external wash Apply 1 application topically daily      clindamycin (CLINDAGEL) 1 % gel Apply 1 application topically daily      ibuprofen (ADVIL;MOTRIN) 800 MG tablet Take 1 tablet by mouth 3 times daily as needed for Pain 120 tablet 0     No current facility-administered medications for this visit. Past Medical History:   Diagnosis Date    ADD (attention deficit disorder)           Subjective:  As above. Overall doing well on Adderall, good attention span, able to focus. Having allergy sxs, sneezing, teary eyes. ADHD  Pertinent negatives include no abdominal pain, arthralgias, chest pain, chills, congestion, headaches, nausea, neck pain, sore throat, vomiting or weakness. Review of Systems   Constitutional:  Negative for activity change, appetite change and chills. HENT:  Positive for postnasal drip and sneezing. Negative for congestion, ear pain, mouth sores, rhinorrhea, sinus pressure, sore throat and trouble swallowing. Eyes:  Negative for visual disturbance. Cardiovascular:  Negative for chest pain, palpitations and leg swelling. Gastrointestinal:  Negative for abdominal distention, abdominal pain, blood in stool, constipation, diarrhea, nausea and vomiting. Endocrine: Negative for cold intolerance, heat intolerance, polydipsia and polyuria. Genitourinary:  Negative for difficulty urinating, dysuria, flank pain, frequency and urgency. Musculoskeletal:  Negative for arthralgias, back pain, gait problem, neck pain and neck stiffness. Skin: Negative. Negative for color change. Allergic/Immunologic: Negative.     Neurological:  Negative for dizziness, tremors, speech difficulty, weakness,

## 2023-06-13 RX ORDER — IBUPROFEN 800 MG/1
TABLET ORAL
Qty: 90 TABLET | Refills: 1 | OUTPATIENT
Start: 2023-06-13

## 2023-06-27 ENCOUNTER — OFFICE VISIT (OUTPATIENT)
Dept: PRIMARY CARE CLINIC | Age: 38
End: 2023-06-27
Payer: COMMERCIAL

## 2023-06-27 VITALS
HEART RATE: 67 BPM | TEMPERATURE: 97.5 F | HEIGHT: 60 IN | BODY MASS INDEX: 25.72 KG/M2 | SYSTOLIC BLOOD PRESSURE: 118 MMHG | OXYGEN SATURATION: 94 % | DIASTOLIC BLOOD PRESSURE: 78 MMHG | WEIGHT: 131 LBS

## 2023-06-27 DIAGNOSIS — F90.9 ATTENTION DEFICIT HYPERACTIVITY DISORDER (ADHD), UNSPECIFIED ADHD TYPE: ICD-10-CM

## 2023-06-27 DIAGNOSIS — R20.2 PARESTHESIA OF BOTH HANDS: Primary | ICD-10-CM

## 2023-06-27 PROCEDURE — G8419 CALC BMI OUT NRM PARAM NOF/U: HCPCS | Performed by: INTERNAL MEDICINE

## 2023-06-27 PROCEDURE — G8427 DOCREV CUR MEDS BY ELIG CLIN: HCPCS | Performed by: INTERNAL MEDICINE

## 2023-06-27 PROCEDURE — 99213 OFFICE O/P EST LOW 20 MIN: CPT | Performed by: INTERNAL MEDICINE

## 2023-06-27 PROCEDURE — 1036F TOBACCO NON-USER: CPT | Performed by: INTERNAL MEDICINE

## 2023-06-27 RX ORDER — DEXTROAMPHETAMINE SACCHARATE, AMPHETAMINE ASPARTATE, DEXTROAMPHETAMINE SULFATE AND AMPHETAMINE SULFATE 5; 5; 5; 5 MG/1; MG/1; MG/1; MG/1
20 TABLET ORAL 2 TIMES DAILY
Qty: 60 TABLET | Refills: 0 | Status: SHIPPED | OUTPATIENT
Start: 2023-06-27 | End: 2023-07-27

## 2023-06-27 ASSESSMENT — ENCOUNTER SYMPTOMS
BACK PAIN: 0
RHINORRHEA: 0
ABDOMINAL DISTENTION: 0
NAUSEA: 0
TROUBLE SWALLOWING: 0
SINUS PRESSURE: 0
CONSTIPATION: 0
COLOR CHANGE: 0
VOMITING: 0
BLOOD IN STOOL: 0
ALLERGIC/IMMUNOLOGIC NEGATIVE: 1
SORE THROAT: 0
ABDOMINAL PAIN: 0
DIARRHEA: 0

## 2023-07-17 ENCOUNTER — TELEPHONE (OUTPATIENT)
Dept: PRIMARY CARE CLINIC | Age: 38
End: 2023-07-17

## 2023-07-17 RX ORDER — FLUCONAZOLE 150 MG/1
150 TABLET ORAL ONCE
Qty: 1 TABLET | Refills: 0 | Status: SHIPPED | OUTPATIENT
Start: 2023-07-17 | End: 2023-07-17

## 2023-07-17 NOTE — TELEPHONE ENCOUNTER
Patient requesting something be sent into [facility] on [address]. For a yeast infection. Thank you.

## 2023-07-25 ENCOUNTER — OFFICE VISIT (OUTPATIENT)
Dept: PRIMARY CARE CLINIC | Age: 38
End: 2023-07-25
Payer: COMMERCIAL

## 2023-07-25 VITALS
HEIGHT: 60 IN | OXYGEN SATURATION: 99 % | DIASTOLIC BLOOD PRESSURE: 62 MMHG | WEIGHT: 132 LBS | SYSTOLIC BLOOD PRESSURE: 102 MMHG | TEMPERATURE: 97.8 F | BODY MASS INDEX: 25.91 KG/M2 | HEART RATE: 81 BPM

## 2023-07-25 DIAGNOSIS — F90.9 ATTENTION DEFICIT HYPERACTIVITY DISORDER (ADHD), UNSPECIFIED ADHD TYPE: ICD-10-CM

## 2023-07-25 PROCEDURE — G8427 DOCREV CUR MEDS BY ELIG CLIN: HCPCS | Performed by: INTERNAL MEDICINE

## 2023-07-25 PROCEDURE — G8419 CALC BMI OUT NRM PARAM NOF/U: HCPCS | Performed by: INTERNAL MEDICINE

## 2023-07-25 PROCEDURE — 99213 OFFICE O/P EST LOW 20 MIN: CPT | Performed by: INTERNAL MEDICINE

## 2023-07-25 PROCEDURE — 1036F TOBACCO NON-USER: CPT | Performed by: INTERNAL MEDICINE

## 2023-07-25 RX ORDER — DEXTROAMPHETAMINE SACCHARATE, AMPHETAMINE ASPARTATE, DEXTROAMPHETAMINE SULFATE AND AMPHETAMINE SULFATE 5; 5; 5; 5 MG/1; MG/1; MG/1; MG/1
20 TABLET ORAL 2 TIMES DAILY
Qty: 60 TABLET | Refills: 0 | Status: SHIPPED | OUTPATIENT
Start: 2023-07-25 | End: 2023-08-24

## 2023-07-25 ASSESSMENT — ENCOUNTER SYMPTOMS
NAUSEA: 0
TROUBLE SWALLOWING: 0
BLOOD IN STOOL: 0
SINUS PRESSURE: 0
ABDOMINAL DISTENTION: 0
RHINORRHEA: 1
CONSTIPATION: 0
COLOR CHANGE: 0
SORE THROAT: 0
VOMITING: 0
ABDOMINAL PAIN: 0
BACK PAIN: 0
DIARRHEA: 0
ALLERGIC/IMMUNOLOGIC NEGATIVE: 1

## 2023-07-25 NOTE — PROGRESS NOTES
Scarlet Peterson presents today for   Follow up of ADD and allergies  Current Outpatient Medications   Medication Sig Dispense Refill    amphetamine-dextroamphetamine (ADDERALL, 20MG,) 20 MG tablet Take 1 tablet by mouth in the morning and 1 tablet in the evening. Do all this for 30 days. 60 tablet 0    ibuprofen (ADVIL;MOTRIN) 800 MG tablet Take 1 tablet by mouth 3 times daily as needed for Pain 120 tablet 0    loratadine (CLARITIN) 10 MG tablet Take 1 tablet by mouth daily 30 tablet 2    BENZOYL PEROXIDE 5 % external wash Apply 1 application topically daily      clindamycin (CLINDAGEL) 1 % gel Apply 1 application topically daily       No current facility-administered medications for this visit. Past Medical History:   Diagnosis Date    ADD (attention deficit disorder)           Subjective:  AS above. Continues to take Adderall as directed, helps her stay focused and complete tasks without feeling overwhelm. Claritin is helping her allergy sxs. Review of Systems   Constitutional:  Negative for activity change, appetite change and chills. HENT:  Positive for rhinorrhea. Negative for congestion, ear pain, mouth sores, postnasal drip, sinus pressure, sneezing, sore throat and trouble swallowing. Eyes:  Negative for visual disturbance. Cardiovascular:  Negative for chest pain, palpitations and leg swelling. Gastrointestinal:  Negative for abdominal distention, abdominal pain, blood in stool, constipation, diarrhea, nausea and vomiting. Endocrine: Negative for cold intolerance, heat intolerance, polydipsia and polyuria. Genitourinary:  Negative for difficulty urinating, dysuria, flank pain, frequency and urgency. Musculoskeletal:  Negative for arthralgias, back pain, gait problem, neck pain and neck stiffness. Skin: Negative. Negative for color change. Allergic/Immunologic: Negative.     Neurological:  Negative for dizziness, tremors, speech difficulty, weakness, light-headedness and

## 2023-08-14 ENCOUNTER — TELEPHONE (OUTPATIENT)
Dept: PRIMARY CARE CLINIC | Age: 38
End: 2023-08-14

## 2023-08-14 RX ORDER — CEPHALEXIN 250 MG/1
250 CAPSULE ORAL 4 TIMES DAILY
Qty: 28 CAPSULE | Refills: 0 | Status: SHIPPED | OUTPATIENT
Start: 2023-08-14

## 2023-08-14 NOTE — TELEPHONE ENCOUNTER
[facility] [address]. Patients states she smashed her finger and pulled off the nail and now her finger is swollen and throbbing. Thank you.

## 2023-08-22 ENCOUNTER — OFFICE VISIT (OUTPATIENT)
Dept: PRIMARY CARE CLINIC | Age: 38
End: 2023-08-22
Payer: COMMERCIAL

## 2023-08-22 VITALS
TEMPERATURE: 98.4 F | SYSTOLIC BLOOD PRESSURE: 110 MMHG | OXYGEN SATURATION: 98 % | HEIGHT: 60 IN | DIASTOLIC BLOOD PRESSURE: 72 MMHG | BODY MASS INDEX: 25.91 KG/M2 | HEART RATE: 58 BPM | WEIGHT: 132 LBS

## 2023-08-22 DIAGNOSIS — J30.1 SEASONAL ALLERGIC RHINITIS DUE TO POLLEN: ICD-10-CM

## 2023-08-22 DIAGNOSIS — F90.9 ATTENTION DEFICIT HYPERACTIVITY DISORDER (ADHD), UNSPECIFIED ADHD TYPE: ICD-10-CM

## 2023-08-22 DIAGNOSIS — L03.011 INFECTION OF NAIL BED OF FINGER OF RIGHT HAND: Primary | ICD-10-CM

## 2023-08-22 PROCEDURE — 1036F TOBACCO NON-USER: CPT | Performed by: INTERNAL MEDICINE

## 2023-08-22 PROCEDURE — G8419 CALC BMI OUT NRM PARAM NOF/U: HCPCS | Performed by: INTERNAL MEDICINE

## 2023-08-22 PROCEDURE — 99213 OFFICE O/P EST LOW 20 MIN: CPT | Performed by: INTERNAL MEDICINE

## 2023-08-22 PROCEDURE — G8427 DOCREV CUR MEDS BY ELIG CLIN: HCPCS | Performed by: INTERNAL MEDICINE

## 2023-08-22 RX ORDER — LORATADINE 10 MG/1
10 TABLET ORAL DAILY
Qty: 90 TABLET | Refills: 0 | Status: SHIPPED | OUTPATIENT
Start: 2023-08-22

## 2023-08-22 RX ORDER — DEXTROAMPHETAMINE SACCHARATE, AMPHETAMINE ASPARTATE, DEXTROAMPHETAMINE SULFATE AND AMPHETAMINE SULFATE 5; 5; 5; 5 MG/1; MG/1; MG/1; MG/1
20 TABLET ORAL 2 TIMES DAILY
Qty: 60 TABLET | Refills: 0 | Status: SHIPPED | OUTPATIENT
Start: 2023-08-22 | End: 2023-09-21

## 2023-08-22 ASSESSMENT — ENCOUNTER SYMPTOMS
COLOR CHANGE: 0
ABDOMINAL DISTENTION: 0
RHINORRHEA: 1
ALLERGIC/IMMUNOLOGIC NEGATIVE: 1
SORE THROAT: 0
BACK PAIN: 0
NAUSEA: 0
TROUBLE SWALLOWING: 0
SINUS PRESSURE: 0
CONSTIPATION: 0
VOMITING: 0
ABDOMINAL PAIN: 0
DIARRHEA: 0
BLOOD IN STOOL: 0

## 2023-08-22 NOTE — PROGRESS NOTES
Cervical: No cervical adenopathy. Skin:     General: Skin is warm. Coloration: Skin is not pale. Findings: No rash. Neurological:      General: No focal deficit present. Mental Status: She is alert and oriented to person, place, and time. Assessment:  Joss Perry was seen today for adhd. Diagnoses and all orders for this visit:    Infection of nail bed of finger of right hand  Comments:  Resolved after a week of Keflex, no further tx given. Attention deficit hyperactivity disorder (ADHD), unspecified ADHD type  Comments:  Stable on Adderall with no side effects, cont same, prescribed one month with no rf. Orders:  -     amphetamine-dextroamphetamine (ADDERALL, 20MG,) 20 MG tablet; Take 1 tablet by mouth in the morning and 1 tablet in the evening. Do all this for 30 days. Seasonal allergic rhinitis due to pollen  Comments:  Prescribed Zyrtec 10 qd    Other orders  -     loratadine (CLARITIN) 10 MG tablet;  Take 1 tablet by mouth daily

## 2023-09-19 ENCOUNTER — OFFICE VISIT (OUTPATIENT)
Dept: PRIMARY CARE CLINIC | Age: 38
End: 2023-09-19
Payer: COMMERCIAL

## 2023-09-19 VITALS
RESPIRATION RATE: 16 BRPM | WEIGHT: 130 LBS | TEMPERATURE: 97.6 F | HEIGHT: 60 IN | HEART RATE: 68 BPM | BODY MASS INDEX: 25.52 KG/M2 | OXYGEN SATURATION: 97 % | DIASTOLIC BLOOD PRESSURE: 70 MMHG | SYSTOLIC BLOOD PRESSURE: 120 MMHG

## 2023-09-19 DIAGNOSIS — F90.9 ATTENTION DEFICIT HYPERACTIVITY DISORDER (ADHD), UNSPECIFIED ADHD TYPE: ICD-10-CM

## 2023-09-19 DIAGNOSIS — S66.912A MUSCLE STRAIN OF LEFT WRIST, INITIAL ENCOUNTER: Primary | ICD-10-CM

## 2023-09-19 PROCEDURE — 99213 OFFICE O/P EST LOW 20 MIN: CPT | Performed by: INTERNAL MEDICINE

## 2023-09-19 PROCEDURE — 1036F TOBACCO NON-USER: CPT | Performed by: INTERNAL MEDICINE

## 2023-09-19 PROCEDURE — G8427 DOCREV CUR MEDS BY ELIG CLIN: HCPCS | Performed by: INTERNAL MEDICINE

## 2023-09-19 PROCEDURE — G8419 CALC BMI OUT NRM PARAM NOF/U: HCPCS | Performed by: INTERNAL MEDICINE

## 2023-09-19 RX ORDER — IBUPROFEN 800 MG/1
800 TABLET ORAL 3 TIMES DAILY PRN
Qty: 120 TABLET | Refills: 0 | Status: SHIPPED | OUTPATIENT
Start: 2023-09-19

## 2023-09-19 RX ORDER — DEXTROAMPHETAMINE SACCHARATE, AMPHETAMINE ASPARTATE, DEXTROAMPHETAMINE SULFATE AND AMPHETAMINE SULFATE 5; 5; 5; 5 MG/1; MG/1; MG/1; MG/1
20 TABLET ORAL 2 TIMES DAILY
Qty: 60 TABLET | Refills: 0 | Status: SHIPPED | OUTPATIENT
Start: 2023-09-19 | End: 2023-10-19

## 2023-09-19 NOTE — PROGRESS NOTES
Odette Toussaint presents today for follow up of ADHD, also requesting refill on Ibuprofen for off and on left wrist pain/discomfort. Current Outpatient Medications   Medication Sig Dispense Refill    amphetamine-dextroamphetamine (ADDERALL, 20MG,) 20 MG tablet Take 1 tablet by mouth in the morning and 1 tablet in the evening. Do all this for 30 days. 60 tablet 0    ibuprofen (ADVIL;MOTRIN) 800 MG tablet Take 1 tablet by mouth 3 times daily as needed for Pain 120 tablet 0    loratadine (CLARITIN) 10 MG tablet Take 1 tablet by mouth daily 90 tablet 0    cephALEXin (KEFLEX) 250 MG capsule Take 1 capsule by mouth 4 times daily 28 capsule 0    BENZOYL PEROXIDE 5 % external wash Apply 1 application topically daily      clindamycin (CLINDAGEL) 1 % gel Apply 1 application topically daily       No current facility-administered medications for this visit. Past Medical History:   Diagnosis Date    ADD (attention deficit disorder)           Subjective:  AS above. Doing great on Adderall, takes it every day twice a day. Able to stay on tasks and complete them, able to multi task. No side effects. Still has occ pain left wrist, thinks related to work, uses left hand a lot. Would like refill on Ibuprofen. Review of Systems   Constitutional:  Negative for activity change, appetite change and chills. HENT:  Negative for congestion, ear pain, mouth sores, postnasal drip, rhinorrhea, sinus pressure, sneezing, sore throat and trouble swallowing. Eyes:  Negative for visual disturbance. Cardiovascular:  Negative for chest pain, palpitations and leg swelling. Gastrointestinal:  Negative for abdominal distention, abdominal pain, blood in stool, constipation, diarrhea, nausea and vomiting. Endocrine: Negative for cold intolerance, heat intolerance, polydipsia and polyuria. Genitourinary:  Negative for difficulty urinating, dysuria, flank pain, frequency and urgency.    Musculoskeletal:  Positive for

## 2023-09-20 ASSESSMENT — ENCOUNTER SYMPTOMS
SORE THROAT: 0
ALLERGIC/IMMUNOLOGIC NEGATIVE: 1
ABDOMINAL PAIN: 0
ABDOMINAL DISTENTION: 0
CONSTIPATION: 0
SINUS PRESSURE: 0
BACK PAIN: 0
DIARRHEA: 0
BLOOD IN STOOL: 0
NAUSEA: 0
TROUBLE SWALLOWING: 0
VOMITING: 0
COLOR CHANGE: 0
RHINORRHEA: 0

## 2023-10-10 ENCOUNTER — TELEPHONE (OUTPATIENT)
Dept: PRIMARY CARE CLINIC | Age: 38
End: 2023-10-10

## 2023-10-10 NOTE — TELEPHONE ENCOUNTER
Patient requesting an antibiotic be sent into [facility] on [address] for a burn on her left arm that is red and irritated and painful . Burned yesterday with curling iron. Bumped it and tore the top off of it and now is red and sore. She has put ice and an Neosporin on it. Thank you.

## 2023-10-12 ENCOUNTER — TELEPHONE (OUTPATIENT)
Dept: PRIMARY CARE CLINIC | Age: 38
End: 2023-10-12

## 2023-10-12 RX ORDER — CEPHALEXIN 250 MG/1
250 CAPSULE ORAL 4 TIMES DAILY
Qty: 28 CAPSULE | Refills: 0 | Status: SHIPPED | OUTPATIENT
Start: 2023-10-12

## 2023-10-12 NOTE — TELEPHONE ENCOUNTER
Patient requesting something else be sent into [facility] on [address] for her burn. She states doctor sent in silver sulfADIAZINE (SILVADENE) 1 % cream but she is allergic to Sulfa. Thank you.

## 2023-10-18 ENCOUNTER — OFFICE VISIT (OUTPATIENT)
Dept: PRIMARY CARE CLINIC | Age: 38
End: 2023-10-18

## 2023-10-18 VITALS
OXYGEN SATURATION: 98 % | SYSTOLIC BLOOD PRESSURE: 118 MMHG | HEART RATE: 80 BPM | TEMPERATURE: 98.6 F | HEIGHT: 60 IN | DIASTOLIC BLOOD PRESSURE: 80 MMHG | WEIGHT: 131 LBS | BODY MASS INDEX: 25.72 KG/M2

## 2023-10-18 DIAGNOSIS — F90.2 ATTENTION DEFICIT HYPERACTIVITY DISORDER (ADHD), COMBINED TYPE: ICD-10-CM

## 2023-10-18 DIAGNOSIS — L25.9 CONTACT DERMATITIS, UNSPECIFIED CONTACT DERMATITIS TYPE, UNSPECIFIED TRIGGER: ICD-10-CM

## 2023-10-18 DIAGNOSIS — R20.2 PARESTHESIA: Primary | ICD-10-CM

## 2023-10-18 DIAGNOSIS — B00.1 HERPES LABIALIS: ICD-10-CM

## 2023-10-18 DIAGNOSIS — F90.9 ATTENTION DEFICIT HYPERACTIVITY DISORDER (ADHD), UNSPECIFIED ADHD TYPE: ICD-10-CM

## 2023-10-18 RX ORDER — CLOBETASOL PROPIONATE 0.5 MG/G
OINTMENT TOPICAL
Qty: 45 G | Refills: 0 | Status: SHIPPED | OUTPATIENT
Start: 2023-10-18

## 2023-10-18 RX ORDER — LORATADINE 10 MG/1
10 TABLET ORAL DAILY
Qty: 90 TABLET | Refills: 0 | Status: SHIPPED | OUTPATIENT
Start: 2023-10-18

## 2023-10-18 RX ORDER — VALACYCLOVIR HYDROCHLORIDE 1 G/1
1000 TABLET, FILM COATED ORAL 3 TIMES DAILY
Qty: 21 TABLET | Refills: 0 | Status: SHIPPED | OUTPATIENT
Start: 2023-10-18 | End: 2023-10-25

## 2023-10-18 RX ORDER — IBUPROFEN 800 MG/1
800 TABLET ORAL 3 TIMES DAILY PRN
Qty: 120 TABLET | Refills: 0 | Status: SHIPPED | OUTPATIENT
Start: 2023-10-18

## 2023-10-18 RX ORDER — DEXTROAMPHETAMINE SACCHARATE, AMPHETAMINE ASPARTATE, DEXTROAMPHETAMINE SULFATE AND AMPHETAMINE SULFATE 5; 5; 5; 5 MG/1; MG/1; MG/1; MG/1
20 TABLET ORAL 2 TIMES DAILY
Qty: 60 TABLET | Refills: 0 | Status: SHIPPED | OUTPATIENT
Start: 2023-10-18 | End: 2023-11-17

## 2023-10-19 ASSESSMENT — ENCOUNTER SYMPTOMS
ABDOMINAL PAIN: 0
DIARRHEA: 0
BACK PAIN: 0
TROUBLE SWALLOWING: 0
ALLERGIC/IMMUNOLOGIC NEGATIVE: 1
NAUSEA: 0
RHINORRHEA: 0
COLOR CHANGE: 0
VOMITING: 0
ABDOMINAL DISTENTION: 0
CONSTIPATION: 0
SINUS PRESSURE: 0
SORE THROAT: 0
BLOOD IN STOOL: 0

## 2023-11-06 ENCOUNTER — HOSPITAL ENCOUNTER (OUTPATIENT)
Dept: NEUROLOGY | Age: 38
Discharge: HOME | End: 2023-11-06
Payer: COMMERCIAL

## 2023-11-06 VITALS — WEIGHT: 131.99 LBS | BODY MASS INDEX: 25.91 KG/M2 | HEIGHT: 60 IN

## 2023-11-06 DIAGNOSIS — R20.2 PARESTHESIA: ICD-10-CM

## 2023-11-06 PROCEDURE — 95886 MUSC TEST DONE W/N TEST COMP: CPT

## 2023-11-06 PROCEDURE — 95910 NRV CNDJ TEST 7-8 STUDIES: CPT

## 2023-11-15 ENCOUNTER — OFFICE VISIT (OUTPATIENT)
Dept: PRIMARY CARE CLINIC | Age: 38
End: 2023-11-15
Payer: COMMERCIAL

## 2023-11-15 VITALS
SYSTOLIC BLOOD PRESSURE: 118 MMHG | WEIGHT: 134 LBS | HEART RATE: 89 BPM | HEIGHT: 60 IN | OXYGEN SATURATION: 97 % | DIASTOLIC BLOOD PRESSURE: 80 MMHG | BODY MASS INDEX: 26.31 KG/M2 | TEMPERATURE: 98 F

## 2023-11-15 DIAGNOSIS — G56.02 CARPAL TUNNEL SYNDROME OF LEFT WRIST: Primary | ICD-10-CM

## 2023-11-15 DIAGNOSIS — F90.9 ATTENTION DEFICIT HYPERACTIVITY DISORDER (ADHD), UNSPECIFIED ADHD TYPE: ICD-10-CM

## 2023-11-15 DIAGNOSIS — N39.0 URINARY TRACT INFECTION WITHOUT HEMATURIA, SITE UNSPECIFIED: ICD-10-CM

## 2023-11-15 DIAGNOSIS — T14.8XXA SKIN ABRASION: ICD-10-CM

## 2023-11-15 PROCEDURE — 1036F TOBACCO NON-USER: CPT | Performed by: INTERNAL MEDICINE

## 2023-11-15 PROCEDURE — G8484 FLU IMMUNIZE NO ADMIN: HCPCS | Performed by: INTERNAL MEDICINE

## 2023-11-15 PROCEDURE — G8419 CALC BMI OUT NRM PARAM NOF/U: HCPCS | Performed by: INTERNAL MEDICINE

## 2023-11-15 PROCEDURE — G8427 DOCREV CUR MEDS BY ELIG CLIN: HCPCS | Performed by: INTERNAL MEDICINE

## 2023-11-15 PROCEDURE — 99214 OFFICE O/P EST MOD 30 MIN: CPT | Performed by: INTERNAL MEDICINE

## 2023-11-15 RX ORDER — DEXTROAMPHETAMINE SACCHARATE, AMPHETAMINE ASPARTATE, DEXTROAMPHETAMINE SULFATE AND AMPHETAMINE SULFATE 5; 5; 5; 5 MG/1; MG/1; MG/1; MG/1
20 TABLET ORAL 2 TIMES DAILY
Qty: 60 TABLET | Refills: 0 | Status: SHIPPED | OUTPATIENT
Start: 2023-11-15 | End: 2023-12-15

## 2023-11-15 RX ORDER — CEFDINIR 300 MG/1
CAPSULE ORAL
COMMUNITY
Start: 2023-11-12

## 2023-11-15 RX ORDER — LORATADINE 10 MG/1
10 TABLET ORAL DAILY
Qty: 90 TABLET | Refills: 0 | Status: SHIPPED | OUTPATIENT
Start: 2023-11-15

## 2023-11-15 RX ORDER — IBUPROFEN 800 MG/1
800 TABLET ORAL 3 TIMES DAILY PRN
Qty: 120 TABLET | Refills: 0 | Status: SHIPPED | OUTPATIENT
Start: 2023-11-15

## 2023-11-15 ASSESSMENT — ENCOUNTER SYMPTOMS
NAUSEA: 0
VOMITING: 0
SINUS PRESSURE: 0
ABDOMINAL PAIN: 0
RHINORRHEA: 0
CONSTIPATION: 0
DIARRHEA: 0
ABDOMINAL DISTENTION: 0
ALLERGIC/IMMUNOLOGIC NEGATIVE: 1
TROUBLE SWALLOWING: 0
SORE THROAT: 0
COLOR CHANGE: 0
BLOOD IN STOOL: 0
BACK PAIN: 0

## 2024-01-05 ENCOUNTER — OFFICE VISIT (OUTPATIENT)
Dept: PRIMARY CARE CLINIC | Age: 39
End: 2024-01-05
Payer: COMMERCIAL

## 2024-01-05 VITALS
SYSTOLIC BLOOD PRESSURE: 122 MMHG | RESPIRATION RATE: 16 BRPM | HEIGHT: 60 IN | TEMPERATURE: 98.6 F | OXYGEN SATURATION: 98 % | WEIGHT: 134 LBS | DIASTOLIC BLOOD PRESSURE: 74 MMHG | HEART RATE: 86 BPM | BODY MASS INDEX: 26.31 KG/M2

## 2024-01-05 DIAGNOSIS — F90.9 ATTENTION DEFICIT HYPERACTIVITY DISORDER (ADHD), UNSPECIFIED ADHD TYPE: ICD-10-CM

## 2024-01-05 PROCEDURE — 1036F TOBACCO NON-USER: CPT | Performed by: INTERNAL MEDICINE

## 2024-01-05 PROCEDURE — G8484 FLU IMMUNIZE NO ADMIN: HCPCS | Performed by: INTERNAL MEDICINE

## 2024-01-05 PROCEDURE — G8427 DOCREV CUR MEDS BY ELIG CLIN: HCPCS | Performed by: INTERNAL MEDICINE

## 2024-01-05 PROCEDURE — 99214 OFFICE O/P EST MOD 30 MIN: CPT | Performed by: INTERNAL MEDICINE

## 2024-01-05 PROCEDURE — G8419 CALC BMI OUT NRM PARAM NOF/U: HCPCS | Performed by: INTERNAL MEDICINE

## 2024-01-05 RX ORDER — DEXTROAMPHETAMINE SACCHARATE, AMPHETAMINE ASPARTATE, DEXTROAMPHETAMINE SULFATE AND AMPHETAMINE SULFATE 5; 5; 5; 5 MG/1; MG/1; MG/1; MG/1
20 TABLET ORAL 2 TIMES DAILY
Qty: 60 TABLET | Refills: 0 | Status: SHIPPED | OUTPATIENT
Start: 2024-01-05 | End: 2024-02-04

## 2024-01-05 RX ORDER — IBUPROFEN 800 MG/1
800 TABLET ORAL 3 TIMES DAILY PRN
Qty: 120 TABLET | Refills: 0 | Status: SHIPPED | OUTPATIENT
Start: 2024-01-05

## 2024-01-05 RX ORDER — LORATADINE 10 MG/1
10 TABLET ORAL DAILY
Qty: 90 TABLET | Refills: 0 | Status: SHIPPED | OUTPATIENT
Start: 2024-01-05

## 2024-01-05 ASSESSMENT — ENCOUNTER SYMPTOMS
BACK PAIN: 0
VOMITING: 0
ALLERGIC/IMMUNOLOGIC NEGATIVE: 1
ABDOMINAL DISTENTION: 0
SINUS PRESSURE: 0
BLOOD IN STOOL: 0
COLOR CHANGE: 0
CONSTIPATION: 0
TROUBLE SWALLOWING: 0
NAUSEA: 0
RHINORRHEA: 0
SORE THROAT: 0
DIARRHEA: 0
ABDOMINAL PAIN: 0

## 2024-01-05 ASSESSMENT — PATIENT HEALTH QUESTIONNAIRE - PHQ9
SUM OF ALL RESPONSES TO PHQ QUESTIONS 1-9: 0
1. LITTLE INTEREST OR PLEASURE IN DOING THINGS: 0
SUM OF ALL RESPONSES TO PHQ QUESTIONS 1-9: 0
2. FEELING DOWN, DEPRESSED OR HOPELESS: 0
SUM OF ALL RESPONSES TO PHQ9 QUESTIONS 1 & 2: 0
SUM OF ALL RESPONSES TO PHQ QUESTIONS 1-9: 0
SUM OF ALL RESPONSES TO PHQ QUESTIONS 1-9: 0

## 2024-01-05 NOTE — PROGRESS NOTES
Rachana Castañeda presents today for follow up of ADHD, allergies,     Current Outpatient Medications   Medication Sig Dispense Refill    ibuprofen (ADVIL;MOTRIN) 800 MG tablet Take 1 tablet by mouth 3 times daily as needed for Pain 120 tablet 0    amphetamine-dextroamphetamine (ADDERALL, 20MG,) 20 MG tablet Take 1 tablet by mouth in the morning and 1 tablet in the evening. Do all this for 30 days. 60 tablet 0    loratadine (CLARITIN) 10 MG tablet Take 1 tablet by mouth daily 90 tablet 0    cefdinir (OMNICEF) 300 MG capsule TAKE 1 CAPSULE BY MOUTH EVERY 12 HOURS X7DAYS      BENZOYL PEROXIDE 5 % external wash Apply 1 application topically daily      clindamycin (CLINDAGEL) 1 % gel Apply 1 application topically daily      clobetasol (TEMOVATE) 0.05 % ointment Apply topically 2 times daily. (Patient not taking: Reported on 11/15/2023) 45 g 0     No current facility-administered medications for this visit.      Past Medical History:   Diagnosis Date    ADD (attention deficit disorder)           Subjective:  Taking Adderall almost daily, no side effects, keeps her focused. Allergies well controlled on Claritin, needs new Rx.        Review of Systems   Constitutional:  Negative for activity change, appetite change and chills.   HENT:  Negative for congestion, ear pain, mouth sores, postnasal drip, rhinorrhea, sinus pressure, sneezing, sore throat and trouble swallowing.    Eyes:  Negative for visual disturbance.   Cardiovascular:  Negative for chest pain, palpitations and leg swelling.   Gastrointestinal:  Negative for abdominal distention, abdominal pain, blood in stool, constipation, diarrhea, nausea and vomiting.   Endocrine: Negative for cold intolerance, heat intolerance, polydipsia and polyuria.   Genitourinary:  Negative for difficulty urinating, dysuria, flank pain, frequency and urgency.   Musculoskeletal:  Negative for arthralgias, back pain, gait problem, neck pain and neck stiffness.   Skin: Negative.  Negative

## 2024-01-10 ENCOUNTER — TELEPHONE (OUTPATIENT)
Dept: PRIMARY CARE CLINIC | Age: 39
End: 2024-01-10

## 2024-01-10 NOTE — TELEPHONE ENCOUNTER
Pt called back and said her OBGYN called in this prescription for her and does not need it filled by dr Hope anymore. Please disregard.   
Pt called in and stated she has a yeast infection and wants to know if a prescription can be sent in for      fluconazole (DIFLUCAN) 150 MG tablet     [facility] [address]  
- - -

## 2024-02-07 ENCOUNTER — OFFICE VISIT (OUTPATIENT)
Dept: PRIMARY CARE CLINIC | Age: 39
End: 2024-02-07
Payer: COMMERCIAL

## 2024-02-07 VITALS
BODY MASS INDEX: 26.5 KG/M2 | SYSTOLIC BLOOD PRESSURE: 102 MMHG | OXYGEN SATURATION: 98 % | TEMPERATURE: 97.7 F | HEART RATE: 74 BPM | DIASTOLIC BLOOD PRESSURE: 70 MMHG | HEIGHT: 60 IN | WEIGHT: 135 LBS

## 2024-02-07 DIAGNOSIS — F90.9 ATTENTION DEFICIT HYPERACTIVITY DISORDER (ADHD), UNSPECIFIED ADHD TYPE: ICD-10-CM

## 2024-02-07 DIAGNOSIS — E86.0 DEHYDRATION: ICD-10-CM

## 2024-02-07 DIAGNOSIS — F41.0 PANIC ATTACK: Primary | ICD-10-CM

## 2024-02-07 PROCEDURE — 99214 OFFICE O/P EST MOD 30 MIN: CPT | Performed by: INTERNAL MEDICINE

## 2024-02-07 PROCEDURE — 1036F TOBACCO NON-USER: CPT | Performed by: INTERNAL MEDICINE

## 2024-02-07 PROCEDURE — G8419 CALC BMI OUT NRM PARAM NOF/U: HCPCS | Performed by: INTERNAL MEDICINE

## 2024-02-07 PROCEDURE — G8484 FLU IMMUNIZE NO ADMIN: HCPCS | Performed by: INTERNAL MEDICINE

## 2024-02-07 PROCEDURE — G8427 DOCREV CUR MEDS BY ELIG CLIN: HCPCS | Performed by: INTERNAL MEDICINE

## 2024-02-07 RX ORDER — DEXTROAMPHETAMINE SACCHARATE, AMPHETAMINE ASPARTATE, DEXTROAMPHETAMINE SULFATE AND AMPHETAMINE SULFATE 5; 5; 5; 5 MG/1; MG/1; MG/1; MG/1
20 TABLET ORAL 2 TIMES DAILY
Qty: 60 TABLET | Refills: 0 | Status: SHIPPED | OUTPATIENT
Start: 2024-02-07 | End: 2024-03-08

## 2024-02-07 ASSESSMENT — ENCOUNTER SYMPTOMS
TROUBLE SWALLOWING: 0
ABDOMINAL PAIN: 0
BACK PAIN: 0
SINUS PRESSURE: 0
VOMITING: 0
ABDOMINAL DISTENTION: 0
RHINORRHEA: 0
COLOR CHANGE: 0
BLOOD IN STOOL: 0
NAUSEA: 0
DIARRHEA: 0
SORE THROAT: 0
CONSTIPATION: 0
CHEST TIGHTNESS: 1
ALLERGIC/IMMUNOLOGIC NEGATIVE: 1

## 2024-02-07 NOTE — PROGRESS NOTES
intolerance, heat intolerance, polydipsia and polyuria.   Genitourinary:  Negative for difficulty urinating, dysuria, flank pain, frequency and urgency.   Musculoskeletal:  Negative for arthralgias, back pain, gait problem, neck pain and neck stiffness.   Skin: Negative.  Negative for color change.   Allergic/Immunologic: Negative.    Neurological:  Negative for dizziness, tremors, speech difficulty, weakness, light-headedness and headaches.   Hematological: Negative.    Psychiatric/Behavioral:  Positive for decreased concentration. The patient is hyperactive.           Objective:  /70 (Site: Left Upper Arm, Position: Sitting, Cuff Size: Medium Adult)   Pulse 74   Temp 97.7 °F (36.5 °C)   Ht 1.524 m (5')   Wt 61.2 kg (135 lb)   LMP 01/22/2024 (Approximate)   SpO2 98%   BMI 26.37 kg/m²      Physical Exam  Vitals reviewed.   Constitutional:       Appearance: Normal appearance.   HENT:      Head: Normocephalic.      Right Ear: External ear normal. There is no impacted cerumen.      Left Ear: External ear normal. There is no impacted cerumen.      Nose: Nose normal.      Mouth/Throat:      Pharynx: Oropharynx is clear. No oropharyngeal exudate.   Eyes:      Extraocular Movements: Extraocular movements intact.      Conjunctiva/sclera: Conjunctivae normal.      Pupils: Pupils are equal, round, and reactive to light.   Cardiovascular:      Rate and Rhythm: Normal rate and regular rhythm.      Heart sounds: No murmur heard.     No friction rub. No gallop.   Pulmonary:      Effort: Pulmonary effort is normal.      Breath sounds: Normal breath sounds.   Abdominal:      General: Bowel sounds are normal. There is no distension.      Palpations: Abdomen is soft. There is no mass.      Tenderness: There is no abdominal tenderness. There is no guarding or rebound.   Musculoskeletal:         General: No swelling, tenderness or deformity.      Cervical back: Neck supple. No tenderness.   Lymphadenopathy:      Cervical:

## 2024-03-06 ENCOUNTER — OFFICE VISIT (OUTPATIENT)
Dept: PRIMARY CARE CLINIC | Age: 39
End: 2024-03-06
Payer: COMMERCIAL

## 2024-03-06 VITALS
BODY MASS INDEX: 26.11 KG/M2 | HEIGHT: 60 IN | OXYGEN SATURATION: 99 % | WEIGHT: 133 LBS | DIASTOLIC BLOOD PRESSURE: 70 MMHG | HEART RATE: 94 BPM | TEMPERATURE: 97.1 F | SYSTOLIC BLOOD PRESSURE: 112 MMHG

## 2024-03-06 DIAGNOSIS — M25.50 ARTHRALGIA, UNSPECIFIED JOINT: Primary | ICD-10-CM

## 2024-03-06 DIAGNOSIS — J30.9 ALLERGIC RHINITIS, UNSPECIFIED SEASONALITY, UNSPECIFIED TRIGGER: ICD-10-CM

## 2024-03-06 DIAGNOSIS — F90.9 ATTENTION DEFICIT HYPERACTIVITY DISORDER (ADHD), UNSPECIFIED ADHD TYPE: ICD-10-CM

## 2024-03-06 PROCEDURE — 99214 OFFICE O/P EST MOD 30 MIN: CPT | Performed by: INTERNAL MEDICINE

## 2024-03-06 PROCEDURE — G8419 CALC BMI OUT NRM PARAM NOF/U: HCPCS | Performed by: INTERNAL MEDICINE

## 2024-03-06 PROCEDURE — G8484 FLU IMMUNIZE NO ADMIN: HCPCS | Performed by: INTERNAL MEDICINE

## 2024-03-06 PROCEDURE — G8427 DOCREV CUR MEDS BY ELIG CLIN: HCPCS | Performed by: INTERNAL MEDICINE

## 2024-03-06 PROCEDURE — 1036F TOBACCO NON-USER: CPT | Performed by: INTERNAL MEDICINE

## 2024-03-06 RX ORDER — DEXTROAMPHETAMINE SACCHARATE, AMPHETAMINE ASPARTATE, DEXTROAMPHETAMINE SULFATE AND AMPHETAMINE SULFATE 5; 5; 5; 5 MG/1; MG/1; MG/1; MG/1
20 TABLET ORAL 2 TIMES DAILY
Qty: 60 TABLET | Refills: 0 | Status: SHIPPED | OUTPATIENT
Start: 2024-03-06 | End: 2024-04-05

## 2024-03-06 RX ORDER — IBUPROFEN 800 MG/1
800 TABLET ORAL 3 TIMES DAILY PRN
Qty: 120 TABLET | Refills: 0 | Status: SHIPPED | OUTPATIENT
Start: 2024-03-06

## 2024-03-06 SDOH — ECONOMIC STABILITY: FOOD INSECURITY: WITHIN THE PAST 12 MONTHS, THE FOOD YOU BOUGHT JUST DIDN'T LAST AND YOU DIDN'T HAVE MONEY TO GET MORE.: NEVER TRUE

## 2024-03-06 SDOH — ECONOMIC STABILITY: INCOME INSECURITY: HOW HARD IS IT FOR YOU TO PAY FOR THE VERY BASICS LIKE FOOD, HOUSING, MEDICAL CARE, AND HEATING?: NOT HARD AT ALL

## 2024-03-06 SDOH — ECONOMIC STABILITY: FOOD INSECURITY: WITHIN THE PAST 12 MONTHS, YOU WORRIED THAT YOUR FOOD WOULD RUN OUT BEFORE YOU GOT MONEY TO BUY MORE.: NEVER TRUE

## 2024-03-06 ASSESSMENT — ENCOUNTER SYMPTOMS
ABDOMINAL PAIN: 0
TROUBLE SWALLOWING: 0
ABDOMINAL DISTENTION: 0
SORE THROAT: 0
DIARRHEA: 0
SINUS PRESSURE: 0
VOMITING: 0
NAUSEA: 0
ALLERGIC/IMMUNOLOGIC NEGATIVE: 1
CONSTIPATION: 0
BACK PAIN: 0
BLOOD IN STOOL: 0
COLOR CHANGE: 0
RHINORRHEA: 0

## 2024-03-06 NOTE — PROGRESS NOTES
Skin:     General: Skin is warm.      Coloration: Skin is not pale.      Findings: No rash.   Neurological:      General: No focal deficit present.      Mental Status: She is alert and oriented to person, place, and time.          Assessment:  Rachana was seen today for adhd.    Diagnoses and all orders for this visit:    Arthralgia, unspecified joint  Comments:  Intermittent, usually reated to old injuries, respond to prn Ibuprofen, renewed.    Attention deficit hyperactivity disorder (ADHD), unspecified ADHD type  Comments:  Stable on Adderall with no side effects, cont same, prescribed one month with no rf.   Orders:  -     amphetamine-dextroamphetamine (ADDERALL, 20MG,) 20 MG tablet; Take 1 tablet by mouth in the morning and 1 tablet in the evening. Do all this for 30 days.    Allergic rhinitis, unspecified seasonality, unspecified trigger  Comments:  Causing pressure left ear.  Try Claritin, if no better add decongestant.    Other orders  -     ibuprofen (ADVIL;MOTRIN) 800 MG tablet; Take 1 tablet by mouth 3 times daily as needed for Pain

## 2024-04-02 ENCOUNTER — OFFICE VISIT (OUTPATIENT)
Dept: PRIMARY CARE CLINIC | Age: 39
End: 2024-04-02
Payer: COMMERCIAL

## 2024-04-02 VITALS
HEIGHT: 60 IN | DIASTOLIC BLOOD PRESSURE: 78 MMHG | WEIGHT: 137 LBS | HEART RATE: 70 BPM | SYSTOLIC BLOOD PRESSURE: 110 MMHG | BODY MASS INDEX: 26.9 KG/M2 | TEMPERATURE: 98.2 F | OXYGEN SATURATION: 98 %

## 2024-04-02 DIAGNOSIS — Z12.31 ENCOUNTER FOR SCREENING MAMMOGRAM FOR BREAST CANCER: Primary | ICD-10-CM

## 2024-04-02 DIAGNOSIS — J30.1 SEASONAL ALLERGIC RHINITIS DUE TO POLLEN: ICD-10-CM

## 2024-04-02 DIAGNOSIS — F90.9 ATTENTION DEFICIT HYPERACTIVITY DISORDER (ADHD), UNSPECIFIED ADHD TYPE: ICD-10-CM

## 2024-04-02 PROCEDURE — 99214 OFFICE O/P EST MOD 30 MIN: CPT | Performed by: INTERNAL MEDICINE

## 2024-04-02 PROCEDURE — G8419 CALC BMI OUT NRM PARAM NOF/U: HCPCS | Performed by: INTERNAL MEDICINE

## 2024-04-02 PROCEDURE — G8427 DOCREV CUR MEDS BY ELIG CLIN: HCPCS | Performed by: INTERNAL MEDICINE

## 2024-04-02 PROCEDURE — 1036F TOBACCO NON-USER: CPT | Performed by: INTERNAL MEDICINE

## 2024-04-02 RX ORDER — LORATADINE 10 MG/1
10 TABLET ORAL DAILY
Qty: 90 TABLET | Refills: 0 | Status: SHIPPED | OUTPATIENT
Start: 2024-04-02

## 2024-04-02 RX ORDER — DEXTROAMPHETAMINE SACCHARATE, AMPHETAMINE ASPARTATE, DEXTROAMPHETAMINE SULFATE AND AMPHETAMINE SULFATE 5; 5; 5; 5 MG/1; MG/1; MG/1; MG/1
20 TABLET ORAL 2 TIMES DAILY
Qty: 60 TABLET | Refills: 0 | Status: SHIPPED | OUTPATIENT
Start: 2024-04-02 | End: 2024-05-02

## 2024-04-02 ASSESSMENT — ENCOUNTER SYMPTOMS
BLOOD IN STOOL: 0
VOMITING: 0
TROUBLE SWALLOWING: 0
DIARRHEA: 0
BACK PAIN: 0
COLOR CHANGE: 0
NAUSEA: 0
ABDOMINAL PAIN: 0
SINUS PRESSURE: 0
ABDOMINAL DISTENTION: 0
CONSTIPATION: 0

## 2024-04-02 NOTE — PROGRESS NOTES
Rachana AMINA Castañeda presents today for follow up of ADHD and allergies    Current Outpatient Medications   Medication Sig Dispense Refill    amphetamine-dextroamphetamine (ADDERALL, 20MG,) 20 MG tablet Take 1 tablet by mouth in the morning and 1 tablet in the evening. Do all this for 30 days. 60 tablet 0    loratadine (CLARITIN) 10 MG tablet Take 1 tablet by mouth daily 90 tablet 0    ibuprofen (ADVIL;MOTRIN) 800 MG tablet Take 1 tablet by mouth 3 times daily as needed for Pain 120 tablet 0    BENZOYL PEROXIDE 5 % external wash Apply 1 application topically daily      clindamycin (CLINDAGEL) 1 % gel Apply 1 application topically daily       No current facility-administered medications for this visit.      Past Medical History:   Diagnosis Date    ADD (attention deficit disorder)           Subjective:  ADHD  Pertinent negatives include no abdominal pain, arthralgias, chest pain, chills, congestion, headaches, nausea, neck pain, sore throat, vomiting or weakness.      Review of Systems   Constitutional:  Negative for activity change, appetite change and chills.   HENT:  Negative for congestion, ear pain, mouth sores, postnasal drip, rhinorrhea, sinus pressure, sneezing, sore throat and trouble swallowing.    Eyes:  Negative for visual disturbance.   Cardiovascular:  Negative for chest pain, palpitations and leg swelling.   Gastrointestinal:  Negative for abdominal distention, abdominal pain, blood in stool, constipation, diarrhea, nausea and vomiting.   Endocrine: Negative for cold intolerance, heat intolerance, polydipsia and polyuria.   Genitourinary:  Negative for difficulty urinating, dysuria, flank pain, frequency and urgency.   Musculoskeletal:  Negative for arthralgias, back pain, gait problem, neck pain and neck stiffness.   Skin: Negative.  Negative for color change.   Allergic/Immunologic: Negative.    Neurological:  Negative for dizziness, tremors, speech difficulty, weakness, light-headedness and headaches.

## 2024-05-01 ENCOUNTER — OFFICE VISIT (OUTPATIENT)
Dept: PRIMARY CARE CLINIC | Age: 39
End: 2024-05-01
Payer: COMMERCIAL

## 2024-05-01 VITALS
WEIGHT: 137 LBS | HEIGHT: 60 IN | OXYGEN SATURATION: 99 % | BODY MASS INDEX: 26.9 KG/M2 | HEART RATE: 83 BPM | TEMPERATURE: 98.4 F | DIASTOLIC BLOOD PRESSURE: 80 MMHG | SYSTOLIC BLOOD PRESSURE: 118 MMHG

## 2024-05-01 DIAGNOSIS — F90.9 ATTENTION DEFICIT HYPERACTIVITY DISORDER (ADHD), UNSPECIFIED ADHD TYPE: ICD-10-CM

## 2024-05-01 DIAGNOSIS — Z12.31 ENCOUNTER FOR SCREENING MAMMOGRAM FOR MALIGNANT NEOPLASM OF BREAST: Primary | ICD-10-CM

## 2024-05-01 DIAGNOSIS — J06.9 URTI (ACUTE UPPER RESPIRATORY INFECTION): ICD-10-CM

## 2024-05-01 PROCEDURE — 1036F TOBACCO NON-USER: CPT | Performed by: INTERNAL MEDICINE

## 2024-05-01 PROCEDURE — 99214 OFFICE O/P EST MOD 30 MIN: CPT | Performed by: INTERNAL MEDICINE

## 2024-05-01 PROCEDURE — G8419 CALC BMI OUT NRM PARAM NOF/U: HCPCS | Performed by: INTERNAL MEDICINE

## 2024-05-01 PROCEDURE — G8427 DOCREV CUR MEDS BY ELIG CLIN: HCPCS | Performed by: INTERNAL MEDICINE

## 2024-05-01 RX ORDER — DEXTROAMPHETAMINE SACCHARATE, AMPHETAMINE ASPARTATE, DEXTROAMPHETAMINE SULFATE AND AMPHETAMINE SULFATE 5; 5; 5; 5 MG/1; MG/1; MG/1; MG/1
20 TABLET ORAL 2 TIMES DAILY
Qty: 60 TABLET | Refills: 0 | Status: SHIPPED | OUTPATIENT
Start: 2024-05-01 | End: 2024-05-31

## 2024-05-01 RX ORDER — ETONOGESTREL AND ETHINYL ESTRADIOL .12; .015 MG/D; MG/D
RING VAGINAL
COMMUNITY
Start: 2024-04-29

## 2024-05-01 ASSESSMENT — ENCOUNTER SYMPTOMS
SORE THROAT: 1
VOMITING: 0
ABDOMINAL DISTENTION: 0
ABDOMINAL PAIN: 0
NAUSEA: 0
COLOR CHANGE: 0
ALLERGIC/IMMUNOLOGIC NEGATIVE: 1
SINUS PRESSURE: 0
RHINORRHEA: 0
BACK PAIN: 0
TROUBLE SWALLOWING: 0
CONSTIPATION: 0
DIARRHEA: 0
BLOOD IN STOOL: 0

## 2024-05-01 NOTE — PROGRESS NOTES
Rachana Castañeda presents today for follow up of ADHD    Current Outpatient Medications   Medication Sig Dispense Refill    ELURYNG 0.12-0.015 MG/24HR vaginal ring       amphetamine-dextroamphetamine (ADDERALL, 20MG,) 20 MG tablet Take 1 tablet by mouth in the morning and 1 tablet in the evening. Do all this for 30 days. 60 tablet 0    loratadine (CLARITIN) 10 MG tablet Take 1 tablet by mouth daily 90 tablet 0    ibuprofen (ADVIL;MOTRIN) 800 MG tablet Take 1 tablet by mouth 3 times daily as needed for Pain 120 tablet 0    BENZOYL PEROXIDE 5 % external wash Apply 1 application topically daily      clindamycin (CLINDAGEL) 1 % gel Apply 1 application topically daily       No current facility-administered medications for this visit.      Past Medical History:   Diagnosis Date    ADD (attention deficit disorder)           Subjective:  AS above.  Adderall helps her focus and complete tasks, no side effects. Has had a scratchy/sore throat for 2 days, no other sxs.     ADHD  Associated symptoms include a sore throat. Pertinent negatives include no abdominal pain, arthralgias, chest pain, chills, congestion, headaches, nausea, neck pain, vomiting or weakness.   Pharyngitis  Associated symptoms include a sore throat. Pertinent negatives include no abdominal pain, arthralgias, chest pain, chills, congestion, headaches, nausea, neck pain, vomiting or weakness.      Review of Systems   Constitutional:  Negative for activity change, appetite change and chills.   HENT:  Positive for sore throat. Negative for congestion, ear pain, mouth sores, postnasal drip, rhinorrhea, sinus pressure, sneezing and trouble swallowing.    Eyes:  Negative for visual disturbance.   Cardiovascular:  Negative for chest pain, palpitations and leg swelling.   Gastrointestinal:  Negative for abdominal distention, abdominal pain, blood in stool, constipation, diarrhea, nausea and vomiting.   Endocrine: Negative for cold intolerance, heat intolerance,

## 2024-05-29 ENCOUNTER — TELEPHONE (OUTPATIENT)
Dept: PRIMARY CARE CLINIC | Age: 39
End: 2024-05-29

## 2024-07-05 ENCOUNTER — OFFICE VISIT (OUTPATIENT)
Dept: PRIMARY CARE CLINIC | Age: 39
End: 2024-07-05
Payer: COMMERCIAL

## 2024-07-05 VITALS
DIASTOLIC BLOOD PRESSURE: 70 MMHG | BODY MASS INDEX: 23.22 KG/M2 | RESPIRATION RATE: 16 BRPM | OXYGEN SATURATION: 98 % | TEMPERATURE: 97.6 F | HEIGHT: 64 IN | HEART RATE: 64 BPM | WEIGHT: 136 LBS | SYSTOLIC BLOOD PRESSURE: 110 MMHG

## 2024-07-05 DIAGNOSIS — F90.9 ATTENTION DEFICIT HYPERACTIVITY DISORDER (ADHD), UNSPECIFIED ADHD TYPE: ICD-10-CM

## 2024-07-05 PROCEDURE — G8420 CALC BMI NORM PARAMETERS: HCPCS | Performed by: INTERNAL MEDICINE

## 2024-07-05 PROCEDURE — 1036F TOBACCO NON-USER: CPT | Performed by: INTERNAL MEDICINE

## 2024-07-05 PROCEDURE — 99213 OFFICE O/P EST LOW 20 MIN: CPT | Performed by: INTERNAL MEDICINE

## 2024-07-05 PROCEDURE — G8427 DOCREV CUR MEDS BY ELIG CLIN: HCPCS | Performed by: INTERNAL MEDICINE

## 2024-07-05 RX ORDER — IBUPROFEN 800 MG/1
800 TABLET ORAL 3 TIMES DAILY PRN
Qty: 90 TABLET | Refills: 0 | Status: SHIPPED | OUTPATIENT
Start: 2024-07-05

## 2024-07-05 RX ORDER — DEXTROAMPHETAMINE SACCHARATE, AMPHETAMINE ASPARTATE, DEXTROAMPHETAMINE SULFATE AND AMPHETAMINE SULFATE 5; 5; 5; 5 MG/1; MG/1; MG/1; MG/1
20 TABLET ORAL 2 TIMES DAILY
Qty: 60 TABLET | Refills: 0 | Status: SHIPPED | OUTPATIENT
Start: 2024-07-05 | End: 2024-08-04

## 2024-07-05 NOTE — PROGRESS NOTES
stiffness.   Skin: Negative.  Negative for color change.   Allergic/Immunologic: Negative.    Neurological:  Negative for dizziness, tremors, speech difficulty, weakness, light-headedness and headaches.   Hematological: Negative.    Psychiatric/Behavioral:  Positive for decreased concentration.           Objective:  /70   Pulse 64   Temp 97.6 °F (36.4 °C)   Resp 16   Ht 1.626 m (5' 4\")   Wt 61.7 kg (136 lb)   LMP 06/27/2024   SpO2 98%   BMI 23.34 kg/m²      Physical Exam  HENT:      Head: Normocephalic.      Right Ear: Tympanic membrane and external ear normal. There is no impacted cerumen.      Left Ear: Tympanic membrane and external ear normal. There is no impacted cerumen.      Nose: Nose normal.      Mouth/Throat:      Pharynx: Oropharynx is clear. No oropharyngeal exudate.   Eyes:      Extraocular Movements: Extraocular movements intact.      Conjunctiva/sclera: Conjunctivae normal.      Pupils: Pupils are equal, round, and reactive to light.   Cardiovascular:      Rate and Rhythm: Normal rate and regular rhythm.      Heart sounds: No murmur heard.     No friction rub. No gallop.   Pulmonary:      Effort: Pulmonary effort is normal.      Breath sounds: Normal breath sounds.   Abdominal:      General: Bowel sounds are normal. There is no distension.      Palpations: Abdomen is soft. There is no mass.      Tenderness: There is no abdominal tenderness. There is no guarding or rebound.   Musculoskeletal:         General: No swelling, tenderness or deformity. Normal range of motion.      Cervical back: Normal range of motion and neck supple. No tenderness.   Lymphadenopathy:      Cervical: No cervical adenopathy.   Skin:     General: Skin is warm.      Coloration: Skin is not pale.      Findings: No rash.   Neurological:      General: No focal deficit present.      Mental Status: She is alert and oriented to person, place, and time.          Assessment:  Rachana was seen today for medication

## 2024-08-06 ENCOUNTER — OFFICE VISIT (OUTPATIENT)
Dept: PRIMARY CARE CLINIC | Age: 39
End: 2024-08-06
Payer: COMMERCIAL

## 2024-08-06 VITALS
OXYGEN SATURATION: 98 % | HEART RATE: 73 BPM | TEMPERATURE: 97.3 F | SYSTOLIC BLOOD PRESSURE: 102 MMHG | WEIGHT: 138 LBS | DIASTOLIC BLOOD PRESSURE: 68 MMHG | HEIGHT: 64 IN | BODY MASS INDEX: 23.56 KG/M2

## 2024-08-06 DIAGNOSIS — F90.9 ATTENTION DEFICIT HYPERACTIVITY DISORDER (ADHD), UNSPECIFIED ADHD TYPE: ICD-10-CM

## 2024-08-06 DIAGNOSIS — J30.1 SEASONAL ALLERGIC RHINITIS DUE TO POLLEN: Primary | ICD-10-CM

## 2024-08-06 DIAGNOSIS — J02.9 SORE THROAT: ICD-10-CM

## 2024-08-06 PROCEDURE — G8427 DOCREV CUR MEDS BY ELIG CLIN: HCPCS | Performed by: INTERNAL MEDICINE

## 2024-08-06 PROCEDURE — 99214 OFFICE O/P EST MOD 30 MIN: CPT | Performed by: INTERNAL MEDICINE

## 2024-08-06 PROCEDURE — G8420 CALC BMI NORM PARAMETERS: HCPCS | Performed by: INTERNAL MEDICINE

## 2024-08-06 PROCEDURE — 1036F TOBACCO NON-USER: CPT | Performed by: INTERNAL MEDICINE

## 2024-08-06 RX ORDER — DEXTROAMPHETAMINE SACCHARATE, AMPHETAMINE ASPARTATE, DEXTROAMPHETAMINE SULFATE AND AMPHETAMINE SULFATE 5; 5; 5; 5 MG/1; MG/1; MG/1; MG/1
20 TABLET ORAL 2 TIMES DAILY
Qty: 60 TABLET | Refills: 0 | Status: SHIPPED | OUTPATIENT
Start: 2024-08-06 | End: 2024-09-05

## 2024-08-06 RX ORDER — IBUPROFEN 800 MG/1
800 TABLET ORAL 3 TIMES DAILY PRN
Qty: 90 TABLET | Refills: 0 | Status: CANCELLED | OUTPATIENT
Start: 2024-08-06

## 2024-08-06 RX ORDER — LORATADINE 10 MG/1
10 TABLET ORAL DAILY
Qty: 90 TABLET | Refills: 0 | Status: SHIPPED | OUTPATIENT
Start: 2024-08-06

## 2024-08-06 ASSESSMENT — ENCOUNTER SYMPTOMS
NAUSEA: 0
SORE THROAT: 1
RHINORRHEA: 0
COLOR CHANGE: 0
SINUS PRESSURE: 0
CONSTIPATION: 0
DIARRHEA: 0
ABDOMINAL PAIN: 0
BLOOD IN STOOL: 0
VOMITING: 0
BACK PAIN: 0
ALLERGIC/IMMUNOLOGIC NEGATIVE: 1
ABDOMINAL DISTENTION: 0
TROUBLE SWALLOWING: 0

## 2024-08-06 NOTE — PROGRESS NOTES
Rachana Castañeda presents today for   Follow up of ADHD, Allergies complaining of off and on sore throat  Current Outpatient Medications   Medication Sig Dispense Refill    amphetamine-dextroamphetamine (ADDERALL, 20MG,) 20 MG tablet Take 1 tablet by mouth in the morning and 1 tablet in the evening. Do all this for 30 days. 60 tablet 0    loratadine (CLARITIN) 10 MG tablet Take 1 tablet by mouth daily 90 tablet 0    ibuprofen (ADVIL;MOTRIN) 800 MG tablet Take 1 tablet by mouth 3 times daily as needed for Pain 90 tablet 0    ELURYNG 0.12-0.015 MG/24HR vaginal ring       BENZOYL PEROXIDE 5 % external wash Apply 1 application topically daily      clindamycin (CLINDAGEL) 1 % gel Apply 1 application topically daily       No current facility-administered medications for this visit.      Past Medical History:   Diagnosis Date    ADD (attention deficit disorder)           Subjective:  Doing well on Adderall, takes it every day, able to focus, function and complete tasks. Off and on wakes up with sore throat that responds to gargling, last just a couple of day.  Taking Claritin, denies postnasal drainage or heartburn.     ADHD  Associated symptoms include a sore throat. Pertinent negatives include no abdominal pain, arthralgias, chest pain, chills, congestion, headaches, nausea, neck pain, vomiting or weakness.      Review of Systems   Constitutional:  Negative for activity change, appetite change and chills.   HENT:  Positive for sore throat. Negative for congestion, ear pain, mouth sores, postnasal drip, rhinorrhea, sinus pressure, sneezing and trouble swallowing.    Eyes:  Negative for visual disturbance.   Cardiovascular:  Negative for chest pain, palpitations and leg swelling.   Gastrointestinal:  Negative for abdominal distention, abdominal pain, blood in stool, constipation, diarrhea, nausea and vomiting.   Endocrine: Negative for cold intolerance, heat intolerance, polydipsia and polyuria.   Genitourinary:  Negative for

## 2024-09-03 ENCOUNTER — OFFICE VISIT (OUTPATIENT)
Dept: PRIMARY CARE CLINIC | Age: 39
End: 2024-09-03
Payer: COMMERCIAL

## 2024-09-03 VITALS
WEIGHT: 137 LBS | SYSTOLIC BLOOD PRESSURE: 110 MMHG | DIASTOLIC BLOOD PRESSURE: 78 MMHG | OXYGEN SATURATION: 98 % | TEMPERATURE: 97.7 F | HEART RATE: 71 BPM | HEIGHT: 64 IN | BODY MASS INDEX: 23.39 KG/M2

## 2024-09-03 DIAGNOSIS — F90.9 ATTENTION DEFICIT HYPERACTIVITY DISORDER (ADHD), UNSPECIFIED ADHD TYPE: ICD-10-CM

## 2024-09-03 DIAGNOSIS — N94.6 MENSTRUAL CRAMPS: ICD-10-CM

## 2024-09-03 DIAGNOSIS — J30.1 SEASONAL ALLERGIC RHINITIS DUE TO POLLEN: Primary | ICD-10-CM

## 2024-09-03 PROCEDURE — G8427 DOCREV CUR MEDS BY ELIG CLIN: HCPCS | Performed by: INTERNAL MEDICINE

## 2024-09-03 PROCEDURE — 1036F TOBACCO NON-USER: CPT | Performed by: INTERNAL MEDICINE

## 2024-09-03 PROCEDURE — G8420 CALC BMI NORM PARAMETERS: HCPCS | Performed by: INTERNAL MEDICINE

## 2024-09-03 PROCEDURE — 99214 OFFICE O/P EST MOD 30 MIN: CPT | Performed by: INTERNAL MEDICINE

## 2024-09-03 RX ORDER — IBUPROFEN 800 MG/1
800 TABLET, FILM COATED ORAL 3 TIMES DAILY PRN
Qty: 90 TABLET | Refills: 0 | Status: SHIPPED | OUTPATIENT
Start: 2024-09-03

## 2024-09-03 RX ORDER — DEXTROAMPHETAMINE SACCHARATE, AMPHETAMINE ASPARTATE, DEXTROAMPHETAMINE SULFATE AND AMPHETAMINE SULFATE 5; 5; 5; 5 MG/1; MG/1; MG/1; MG/1
20 TABLET ORAL 2 TIMES DAILY
Qty: 60 TABLET | Refills: 0 | Status: SHIPPED | OUTPATIENT
Start: 2024-09-03 | End: 2024-10-03

## 2024-09-04 NOTE — PROGRESS NOTES
Rachana Castañeda presents today for follow up of ADHD    Current Outpatient Medications   Medication Sig Dispense Refill    amphetamine-dextroamphetamine (ADDERALL, 20MG,) 20 MG tablet Take 1 tablet by mouth in the morning and 1 tablet in the evening. Do all this for 30 days. 60 tablet 0    ibuprofen (ADVIL;MOTRIN) 800 MG tablet Take 1 tablet by mouth 3 times daily as needed for Pain 90 tablet 0    loratadine (CLARITIN) 10 MG tablet Take 1 tablet by mouth daily 90 tablet 0    ELURYNG 0.12-0.015 MG/24HR vaginal ring       BENZOYL PEROXIDE 5 % external wash Apply 1 application topically daily      clindamycin (CLINDAGEL) 1 % gel Apply 1 application topically daily       No current facility-administered medications for this visit.      Past Medical History:   Diagnosis Date    ADD (attention deficit disorder)           Subjective:  AS above.  Continues to take Adderall 20mg bid on a daily basis.  Allows her to improve attention span, complete tasks and multitask.  No side effects.     ADHD       Review of Systems   Psychiatric/Behavioral:  The patient is nervous/anxious.           Objective:  /78 (Site: Left Upper Arm, Position: Sitting, Cuff Size: Medium Adult)   Pulse 71   Temp 97.7 °F (36.5 °C)   Ht 1.626 m (5' 4\")   Wt 62.1 kg (137 lb)   LMP 07/19/2024 (Exact Date)   SpO2 98%   BMI 23.52 kg/m²      Physical Exam  HENT:      Head: Normocephalic.      Right Ear: External ear normal. There is no impacted cerumen.      Left Ear: External ear normal. There is no impacted cerumen.      Nose: Nose normal.      Mouth/Throat:      Pharynx: Oropharynx is clear. No oropharyngeal exudate.   Eyes:      Extraocular Movements: Extraocular movements intact.      Conjunctiva/sclera: Conjunctivae normal.      Pupils: Pupils are equal, round, and reactive to light.   Cardiovascular:      Rate and Rhythm: Normal rate and regular rhythm.      Heart sounds: No murmur heard.     No friction rub. No gallop.   Pulmonary:

## 2024-09-10 ENCOUNTER — HOSPITAL ENCOUNTER (OUTPATIENT)
Dept: GENERAL RADIOLOGY | Age: 39
Discharge: HOME | End: 2024-09-12
Payer: COMMERCIAL

## 2024-09-10 VITALS — WEIGHT: 133 LBS | BODY MASS INDEX: 22.83 KG/M2

## 2024-09-10 DIAGNOSIS — Z12.31 ENCOUNTER FOR SCREENING MAMMOGRAM FOR MALIGNANT NEOPLASM OF BREAST: ICD-10-CM

## 2024-09-10 PROCEDURE — 77063 BREAST TOMOSYNTHESIS BI: CPT

## 2024-10-01 RX ORDER — IBUPROFEN 800 MG/1
TABLET, FILM COATED ORAL
Qty: 90 TABLET | Refills: 2 | Status: SHIPPED | OUTPATIENT
Start: 2024-10-01

## 2024-10-16 ENCOUNTER — OFFICE VISIT (OUTPATIENT)
Dept: PRIMARY CARE CLINIC | Age: 39
End: 2024-10-16
Payer: COMMERCIAL

## 2024-10-16 VITALS
TEMPERATURE: 99 F | DIASTOLIC BLOOD PRESSURE: 80 MMHG | BODY MASS INDEX: 23.39 KG/M2 | WEIGHT: 137 LBS | HEART RATE: 100 BPM | HEIGHT: 64 IN | OXYGEN SATURATION: 98 % | SYSTOLIC BLOOD PRESSURE: 110 MMHG

## 2024-10-16 DIAGNOSIS — F90.9 ATTENTION DEFICIT HYPERACTIVITY DISORDER (ADHD), UNSPECIFIED ADHD TYPE: ICD-10-CM

## 2024-10-16 PROCEDURE — 99213 OFFICE O/P EST LOW 20 MIN: CPT | Performed by: INTERNAL MEDICINE

## 2024-10-16 PROCEDURE — 1036F TOBACCO NON-USER: CPT | Performed by: INTERNAL MEDICINE

## 2024-10-16 PROCEDURE — G8420 CALC BMI NORM PARAMETERS: HCPCS | Performed by: INTERNAL MEDICINE

## 2024-10-16 PROCEDURE — G8484 FLU IMMUNIZE NO ADMIN: HCPCS | Performed by: INTERNAL MEDICINE

## 2024-10-16 PROCEDURE — G8427 DOCREV CUR MEDS BY ELIG CLIN: HCPCS | Performed by: INTERNAL MEDICINE

## 2024-10-16 RX ORDER — DEXTROAMPHETAMINE SACCHARATE, AMPHETAMINE ASPARTATE, DEXTROAMPHETAMINE SULFATE AND AMPHETAMINE SULFATE 5; 5; 5; 5 MG/1; MG/1; MG/1; MG/1
20 TABLET ORAL 2 TIMES DAILY
Qty: 60 TABLET | Refills: 0 | Status: SHIPPED | OUTPATIENT
Start: 2024-10-16 | End: 2024-11-15

## 2024-10-16 ASSESSMENT — ENCOUNTER SYMPTOMS
SINUS PRESSURE: 0
NAUSEA: 0
COLOR CHANGE: 0
BLOOD IN STOOL: 0
TROUBLE SWALLOWING: 0
RHINORRHEA: 0
CONSTIPATION: 0
DIARRHEA: 0
SORE THROAT: 0
ABDOMINAL PAIN: 0
ABDOMINAL DISTENTION: 0
BACK PAIN: 0
VOMITING: 0
ALLERGIC/IMMUNOLOGIC NEGATIVE: 1

## 2024-10-16 NOTE — PROGRESS NOTES
Rachana Castañeda presents today for follow up of ADHD    Current Outpatient Medications   Medication Sig Dispense Refill    amphetamine-dextroamphetamine (ADDERALL, 20MG,) 20 MG tablet Take 1 tablet by mouth in the morning and 1 tablet in the evening. Do all this for 30 days. 60 tablet 0    ibuprofen (ADVIL;MOTRIN) 800 MG tablet TAKE 1 TABLET BY MOUTH THREE TIMES A DAY AS NEEDED FOR PAIN 90 tablet 2    loratadine (CLARITIN) 10 MG tablet Take 1 tablet by mouth daily 90 tablet 0    BENZOYL PEROXIDE 5 % external wash Apply 1 application topically daily      clindamycin (CLINDAGEL) 1 % gel Apply 1 application topically daily       No current facility-administered medications for this visit.      Past Medical History:   Diagnosis Date    ADD (attention deficit disorder)           Subjective:  AS above.  On daily Adderall, tolerating well.  Allows her to multitask, concentrate and complete tasks, wishes to continue same.        Review of Systems   Constitutional:  Negative for activity change, appetite change and chills.   HENT:  Negative for congestion, ear pain, mouth sores, postnasal drip, rhinorrhea, sinus pressure, sneezing, sore throat and trouble swallowing.    Eyes:  Negative for visual disturbance.   Cardiovascular:  Negative for chest pain, palpitations and leg swelling.   Gastrointestinal:  Negative for abdominal distention, abdominal pain, blood in stool, constipation, diarrhea, nausea and vomiting.   Endocrine: Negative for cold intolerance, heat intolerance, polydipsia and polyuria.   Genitourinary:  Negative for difficulty urinating, dysuria, flank pain, frequency and urgency.   Musculoskeletal:  Negative for arthralgias, back pain, gait problem, neck pain and neck stiffness.   Skin: Negative.  Negative for color change.   Allergic/Immunologic: Negative.    Neurological:  Negative for dizziness, tremors, speech difficulty, weakness, light-headedness and headaches.   Hematological: Negative.

## 2024-11-13 ENCOUNTER — OFFICE VISIT (OUTPATIENT)
Dept: PRIMARY CARE CLINIC | Age: 39
End: 2024-11-13
Payer: COMMERCIAL

## 2024-11-13 VITALS
TEMPERATURE: 98 F | OXYGEN SATURATION: 98 % | SYSTOLIC BLOOD PRESSURE: 120 MMHG | DIASTOLIC BLOOD PRESSURE: 80 MMHG | BODY MASS INDEX: 23.56 KG/M2 | HEART RATE: 87 BPM | WEIGHT: 138 LBS | HEIGHT: 64 IN

## 2024-11-13 DIAGNOSIS — F90.9 ATTENTION DEFICIT HYPERACTIVITY DISORDER (ADHD), UNSPECIFIED ADHD TYPE: ICD-10-CM

## 2024-11-13 PROCEDURE — G8427 DOCREV CUR MEDS BY ELIG CLIN: HCPCS | Performed by: INTERNAL MEDICINE

## 2024-11-13 PROCEDURE — G8420 CALC BMI NORM PARAMETERS: HCPCS | Performed by: INTERNAL MEDICINE

## 2024-11-13 PROCEDURE — 1036F TOBACCO NON-USER: CPT | Performed by: INTERNAL MEDICINE

## 2024-11-13 PROCEDURE — 99213 OFFICE O/P EST LOW 20 MIN: CPT | Performed by: INTERNAL MEDICINE

## 2024-11-13 PROCEDURE — G8484 FLU IMMUNIZE NO ADMIN: HCPCS | Performed by: INTERNAL MEDICINE

## 2024-11-13 RX ORDER — IBUPROFEN 800 MG/1
800 TABLET, FILM COATED ORAL EVERY 8 HOURS PRN
Qty: 90 TABLET | Refills: 2 | Status: CANCELLED | OUTPATIENT
Start: 2024-11-13

## 2024-11-13 RX ORDER — DEXTROAMPHETAMINE SACCHARATE, AMPHETAMINE ASPARTATE, DEXTROAMPHETAMINE SULFATE AND AMPHETAMINE SULFATE 5; 5; 5; 5 MG/1; MG/1; MG/1; MG/1
20 TABLET ORAL 2 TIMES DAILY
Qty: 60 TABLET | Refills: 0 | Status: SHIPPED | OUTPATIENT
Start: 2024-11-13 | End: 2024-12-13

## 2024-11-13 NOTE — PROGRESS NOTES
Rachana Castañeda presents today for follow up of ADHD    Current Outpatient Medications   Medication Sig Dispense Refill    amphetamine-dextroamphetamine (ADDERALL, 20MG,) 20 MG tablet Take 1 tablet by mouth in the morning and 1 tablet in the evening. Do all this for 30 days. 60 tablet 0    ibuprofen (ADVIL;MOTRIN) 800 MG tablet TAKE 1 TABLET BY MOUTH THREE TIMES A DAY AS NEEDED FOR PAIN 90 tablet 2    loratadine (CLARITIN) 10 MG tablet Take 1 tablet by mouth daily 90 tablet 0    BENZOYL PEROXIDE 5 % external wash Apply 1 application topically daily      clindamycin (CLINDAGEL) 1 % gel Apply 1 application topically daily       No current facility-administered medications for this visit.      Past Medical History:   Diagnosis Date    ADD (attention deficit disorder)           Subjective:  AS above.  Takes Adderall every day, no side effects. Allows her to multitask and finish tasks. Needs refill.        Review of Systems   Psychiatric/Behavioral:  Positive for decreased concentration.           Objective:  /80 (Site: Left Upper Arm, Position: Sitting, Cuff Size: Medium Adult)   Pulse 87   Temp 98 °F (36.7 °C)   Ht 1.626 m (5' 4\")   Wt 62.6 kg (138 lb)   LMP 10/24/2024 (Exact Date)   SpO2 98%   BMI 23.69 kg/m²      Physical Exam  HENT:      Head: Normocephalic.      Right Ear: External ear normal. There is no impacted cerumen.      Left Ear: External ear normal. There is no impacted cerumen.      Nose: Nose normal.      Mouth/Throat:      Pharynx: Oropharynx is clear. No oropharyngeal exudate.   Eyes:      Extraocular Movements: Extraocular movements intact.      Conjunctiva/sclera: Conjunctivae normal.   Cardiovascular:      Rate and Rhythm: Normal rate and regular rhythm.      Heart sounds: No murmur heard.     No friction rub. No gallop.   Pulmonary:      Effort: Pulmonary effort is normal.      Breath sounds: Normal breath sounds.   Abdominal:      General: Bowel sounds are normal. There is no

## 2024-11-19 NOTE — TELEPHONE ENCOUNTER
Patient called in requesting refill on    ibuprofen (ADVIL;MOTRIN) 800 MG tablet     [facility] [address]

## 2024-11-20 RX ORDER — IBUPROFEN 800 MG/1
800 TABLET, FILM COATED ORAL EVERY 8 HOURS PRN
Qty: 90 TABLET | Refills: 2 | OUTPATIENT
Start: 2024-11-20

## 2024-12-13 ENCOUNTER — OFFICE VISIT (OUTPATIENT)
Dept: PRIMARY CARE CLINIC | Age: 39
End: 2024-12-13
Payer: COMMERCIAL

## 2024-12-13 VITALS
TEMPERATURE: 98 F | HEIGHT: 64 IN | OXYGEN SATURATION: 99 % | HEART RATE: 92 BPM | WEIGHT: 136 LBS | BODY MASS INDEX: 23.22 KG/M2 | DIASTOLIC BLOOD PRESSURE: 80 MMHG | SYSTOLIC BLOOD PRESSURE: 122 MMHG

## 2024-12-13 DIAGNOSIS — F90.9 ATTENTION DEFICIT HYPERACTIVITY DISORDER (ADHD), UNSPECIFIED ADHD TYPE: ICD-10-CM

## 2024-12-13 DIAGNOSIS — M25.50 ARTHRALGIA, UNSPECIFIED JOINT: ICD-10-CM

## 2024-12-13 DIAGNOSIS — J30.1 SEASONAL ALLERGIC RHINITIS DUE TO POLLEN: Primary | ICD-10-CM

## 2024-12-13 PROCEDURE — G8427 DOCREV CUR MEDS BY ELIG CLIN: HCPCS | Performed by: INTERNAL MEDICINE

## 2024-12-13 PROCEDURE — G8484 FLU IMMUNIZE NO ADMIN: HCPCS | Performed by: INTERNAL MEDICINE

## 2024-12-13 PROCEDURE — 1036F TOBACCO NON-USER: CPT | Performed by: INTERNAL MEDICINE

## 2024-12-13 PROCEDURE — 99214 OFFICE O/P EST MOD 30 MIN: CPT | Performed by: INTERNAL MEDICINE

## 2024-12-13 PROCEDURE — G8420 CALC BMI NORM PARAMETERS: HCPCS | Performed by: INTERNAL MEDICINE

## 2024-12-13 RX ORDER — DEXTROAMPHETAMINE SACCHARATE, AMPHETAMINE ASPARTATE, DEXTROAMPHETAMINE SULFATE AND AMPHETAMINE SULFATE 5; 5; 5; 5 MG/1; MG/1; MG/1; MG/1
20 TABLET ORAL 2 TIMES DAILY
Qty: 60 TABLET | Refills: 0 | Status: SHIPPED | OUTPATIENT
Start: 2024-12-13 | End: 2025-01-12

## 2024-12-13 RX ORDER — ETONOGESTREL AND ETHINYL ESTRADIOL .12; .015 MG/D; MG/D
RING VAGINAL
COMMUNITY
Start: 2024-11-19

## 2024-12-13 RX ORDER — IBUPROFEN 800 MG/1
800 TABLET, FILM COATED ORAL 3 TIMES DAILY PRN
Qty: 90 TABLET | Refills: 2 | Status: SHIPPED | OUTPATIENT
Start: 2024-12-13

## 2024-12-13 ASSESSMENT — ENCOUNTER SYMPTOMS: BACK PAIN: 1

## 2024-12-13 NOTE — PROGRESS NOTES
Rachana Castañeda presents today for follow up of ADHD and low back/joint pain    Current Outpatient Medications   Medication Sig Dispense Refill    ELURYNG 0.12-0.015 MG/24HR vaginal ring INSERT ONE RING PER VAGINA X 3 WEEKS, OFF X 1 WEEK      amphetamine-dextroamphetamine (ADDERALL, 20MG,) 20 MG tablet Take 1 tablet by mouth in the morning and 1 tablet in the evening. Do all this for 30 days. 60 tablet 0    ibuprofen (ADVIL;MOTRIN) 800 MG tablet Take 1 tablet by mouth 3 times daily as needed for Pain 90 tablet 2    loratadine (CLARITIN) 10 MG tablet Take 1 tablet by mouth daily 90 tablet 0    BENZOYL PEROXIDE 5 % external wash Apply 1 application topically daily      clindamycin (CLINDAGEL) 1 % gel Apply 1 application topically daily       No current facility-administered medications for this visit.      Past Medical History:   Diagnosis Date    ADD (attention deficit disorder)           Subjective:  AS above.  Doing great on Adderall, takes it daily, no side effects. 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 Otherwise doing ok.        Review of Systems   Musculoskeletal:  Positive for arthralgias and back pain.   Psychiatric/Behavioral:  Positive for decreased concentration.           Objective:  /80 (Site: Left Upper Arm, Position: Sitting, Cuff Size: Medium Adult)   Pulse 92   Temp 98 °F (36.7 °C)   Ht 1.626 m (5' 4\")   Wt 61.7 kg (136 lb)   LMP 10/24/2024 (Exact Date)   SpO2 99%   BMI 23.34 kg/m²      Physical Exam  Vitals reviewed.   Constitutional:       Appearance: Normal appearance.   HENT:      Head: Normocephalic.      Right Ear: External ear normal. There is no impacted cerumen.      Left Ear: External ear normal. There is no impacted cerumen.      Nose: Nose normal.      Mouth/Throat:      Pharynx: Oropharynx is clear. No oropharyngeal exudate.   Eyes:      Extraocular Movements: Extraocular

## 2025-01-10 ENCOUNTER — TELEPHONE (OUTPATIENT)
Dept: PRIMARY CARE CLINIC | Age: 40
End: 2025-01-10

## 2025-02-11 ENCOUNTER — OFFICE VISIT (OUTPATIENT)
Dept: PRIMARY CARE CLINIC | Age: 40
End: 2025-02-11
Payer: COMMERCIAL

## 2025-02-11 VITALS
BODY MASS INDEX: 23.39 KG/M2 | SYSTOLIC BLOOD PRESSURE: 118 MMHG | HEART RATE: 81 BPM | TEMPERATURE: 98.7 F | HEIGHT: 64 IN | OXYGEN SATURATION: 98 % | WEIGHT: 137 LBS | DIASTOLIC BLOOD PRESSURE: 82 MMHG

## 2025-02-11 DIAGNOSIS — F90.9 ATTENTION DEFICIT HYPERACTIVITY DISORDER (ADHD), UNSPECIFIED ADHD TYPE: Primary | ICD-10-CM

## 2025-02-11 DIAGNOSIS — K21.9 GASTROESOPHAGEAL REFLUX DISEASE WITHOUT ESOPHAGITIS: ICD-10-CM

## 2025-02-11 DIAGNOSIS — K14.3 TONGUE COATING: ICD-10-CM

## 2025-02-11 PROCEDURE — G8427 DOCREV CUR MEDS BY ELIG CLIN: HCPCS | Performed by: INTERNAL MEDICINE

## 2025-02-11 PROCEDURE — 99214 OFFICE O/P EST MOD 30 MIN: CPT | Performed by: INTERNAL MEDICINE

## 2025-02-11 PROCEDURE — 1036F TOBACCO NON-USER: CPT | Performed by: INTERNAL MEDICINE

## 2025-02-11 PROCEDURE — G8420 CALC BMI NORM PARAMETERS: HCPCS | Performed by: INTERNAL MEDICINE

## 2025-02-11 RX ORDER — IBUPROFEN 800 MG/1
800 TABLET, FILM COATED ORAL 3 TIMES DAILY PRN
Qty: 90 TABLET | Refills: 2 | Status: SHIPPED | OUTPATIENT
Start: 2025-02-11

## 2025-02-11 RX ORDER — DEXTROAMPHETAMINE SACCHARATE, AMPHETAMINE ASPARTATE, DEXTROAMPHETAMINE SULFATE AND AMPHETAMINE SULFATE 5; 5; 5; 5 MG/1; MG/1; MG/1; MG/1
20 TABLET ORAL 2 TIMES DAILY
Qty: 60 TABLET | Refills: 0 | Status: SHIPPED | OUTPATIENT
Start: 2025-02-11 | End: 2025-03-13

## 2025-02-11 SDOH — ECONOMIC STABILITY: FOOD INSECURITY: WITHIN THE PAST 12 MONTHS, THE FOOD YOU BOUGHT JUST DIDN'T LAST AND YOU DIDN'T HAVE MONEY TO GET MORE.: NEVER TRUE

## 2025-02-11 SDOH — ECONOMIC STABILITY: FOOD INSECURITY: WITHIN THE PAST 12 MONTHS, YOU WORRIED THAT YOUR FOOD WOULD RUN OUT BEFORE YOU GOT MONEY TO BUY MORE.: NEVER TRUE

## 2025-02-11 ASSESSMENT — ENCOUNTER SYMPTOMS
TROUBLE SWALLOWING: 0
RHINORRHEA: 0
CONSTIPATION: 0
COLOR CHANGE: 0
SORE THROAT: 0
BLOOD IN STOOL: 0
ABDOMINAL DISTENTION: 0
BACK PAIN: 0
DIARRHEA: 0
ALLERGIC/IMMUNOLOGIC NEGATIVE: 1
ABDOMINAL PAIN: 0
NAUSEA: 0
VOMITING: 0
SINUS PRESSURE: 0

## 2025-02-11 ASSESSMENT — PATIENT HEALTH QUESTIONNAIRE - PHQ9
SUM OF ALL RESPONSES TO PHQ QUESTIONS 1-9: 0
1. LITTLE INTEREST OR PLEASURE IN DOING THINGS: NOT AT ALL
SUM OF ALL RESPONSES TO PHQ QUESTIONS 1-9: 0
SUM OF ALL RESPONSES TO PHQ9 QUESTIONS 1 & 2: 0
2. FEELING DOWN, DEPRESSED OR HOPELESS: NOT AT ALL
SUM OF ALL RESPONSES TO PHQ QUESTIONS 1-9: 0
SUM OF ALL RESPONSES TO PHQ QUESTIONS 1-9: 0

## 2025-02-11 NOTE — PROGRESS NOTES
Rachana Castañeda presents today for follow up of ADHD and other issues    Current Outpatient Medications   Medication Sig Dispense Refill    amphetamine-dextroamphetamine (ADDERALL, 20MG,) 20 MG tablet Take 1 tablet by mouth in the morning and 1 tablet in the evening. Do all this for 30 days. 60 tablet 0    ibuprofen (ADVIL;MOTRIN) 800 MG tablet Take 1 tablet by mouth 3 times daily as needed for Pain 90 tablet 2    ELURYNG 0.12-0.015 MG/24HR vaginal ring INSERT ONE RING PER VAGINA X 3 WEEKS, OFF X 1 WEEK      loratadine (CLARITIN) 10 MG tablet Take 1 tablet by mouth daily 90 tablet 0    BENZOYL PEROXIDE 5 % external wash Apply 1 application topically daily      clindamycin (CLINDAGEL) 1 % gel Apply 1 application topically daily       No current facility-administered medications for this visit.      Past Medical History:   Diagnosis Date    ADD (attention deficit disorder)           Subjective:  Was seen by gyn, was not sure if was having an infection due to vag discomfort. Was treated with antibiotics while waiting for cultures which came back neg. Now had coating of her tongue. Wants to be checked for Thrush. Otherwise doing well.  Adderall religiously, able to complete tasks, no side effects.        Review of Systems   Constitutional:  Negative for activity change, appetite change and chills.   HENT:  Negative for congestion, ear pain, mouth sores, postnasal drip, rhinorrhea, sinus pressure, sneezing, sore throat and trouble swallowing.         Coated tongue   Eyes:  Negative for visual disturbance.   Cardiovascular:  Negative for chest pain, palpitations and leg swelling.   Gastrointestinal:  Negative for abdominal distention, abdominal pain, blood in stool, constipation, diarrhea, nausea and vomiting.        [place]   Endocrine: Negative for cold intolerance, heat intolerance, polydipsia and polyuria.   Genitourinary:  Negative for difficulty urinating, dysuria, flank pain, frequency and urgency.

## 2025-02-24 ENCOUNTER — TELEPHONE (OUTPATIENT)
Dept: PRIMARY CARE CLINIC | Age: 40
End: 2025-02-24

## 2025-02-24 RX ORDER — CEPHALEXIN 500 MG/1
500 CAPSULE ORAL 3 TIMES DAILY
Qty: 30 CAPSULE | Refills: 0 | Status: SHIPPED | OUTPATIENT
Start: 2025-02-24 | End: 2025-03-06

## 2025-02-24 NOTE — TELEPHONE ENCOUNTER
Patient phoned stating that her foot is getting infected because she pulled a hang nail; was almost healed but dropped something on it and broke it open.   She also has a sinus infection that started on Thursday . Head congestion and ear pain. No fever or chills. Would like something sent to [facility] on [address]    I did tell her to try epsom salt for her foot .

## 2025-03-11 ENCOUNTER — OFFICE VISIT (OUTPATIENT)
Dept: PRIMARY CARE CLINIC | Age: 40
End: 2025-03-11
Payer: COMMERCIAL

## 2025-03-11 VITALS
WEIGHT: 137 LBS | DIASTOLIC BLOOD PRESSURE: 86 MMHG | TEMPERATURE: 98 F | HEART RATE: 70 BPM | HEIGHT: 64 IN | OXYGEN SATURATION: 98 % | SYSTOLIC BLOOD PRESSURE: 118 MMHG | BODY MASS INDEX: 23.39 KG/M2

## 2025-03-11 DIAGNOSIS — J30.1 SEASONAL ALLERGIC RHINITIS DUE TO POLLEN: Primary | ICD-10-CM

## 2025-03-11 DIAGNOSIS — F90.9 ATTENTION DEFICIT HYPERACTIVITY DISORDER (ADHD), UNSPECIFIED ADHD TYPE: ICD-10-CM

## 2025-03-11 DIAGNOSIS — J06.9 URTI (ACUTE UPPER RESPIRATORY INFECTION): ICD-10-CM

## 2025-03-11 PROCEDURE — 1036F TOBACCO NON-USER: CPT | Performed by: INTERNAL MEDICINE

## 2025-03-11 PROCEDURE — 99214 OFFICE O/P EST MOD 30 MIN: CPT | Performed by: INTERNAL MEDICINE

## 2025-03-11 PROCEDURE — G8427 DOCREV CUR MEDS BY ELIG CLIN: HCPCS | Performed by: INTERNAL MEDICINE

## 2025-03-11 PROCEDURE — G8420 CALC BMI NORM PARAMETERS: HCPCS | Performed by: INTERNAL MEDICINE

## 2025-03-11 RX ORDER — DEXTROAMPHETAMINE SACCHARATE, AMPHETAMINE ASPARTATE, DEXTROAMPHETAMINE SULFATE AND AMPHETAMINE SULFATE 5; 5; 5; 5 MG/1; MG/1; MG/1; MG/1
20 TABLET ORAL 2 TIMES DAILY
Qty: 60 TABLET | Refills: 0 | Status: SHIPPED | OUTPATIENT
Start: 2025-03-11 | End: 2025-04-10

## 2025-03-11 RX ORDER — LORATADINE 10 MG/1
10 TABLET ORAL DAILY
Qty: 90 TABLET | Refills: 0 | Status: SHIPPED | OUTPATIENT
Start: 2025-03-11

## 2025-03-11 ASSESSMENT — ENCOUNTER SYMPTOMS
COUGH: 1
RHINORRHEA: 1

## 2025-03-11 NOTE — PROGRESS NOTES
Rachana Castañeda presents today for follow up of ADD and All Rhinitis    Current Outpatient Medications   Medication Sig Dispense Refill    amphetamine-dextroamphetamine (ADDERALL, 20MG,) 20 MG tablet Take 1 tablet by mouth in the morning and 1 tablet in the evening. Do all this for 30 days. 60 tablet 0    loratadine (CLARITIN) 10 MG tablet Take 1 tablet by mouth daily 90 tablet 0    ibuprofen (ADVIL;MOTRIN) 800 MG tablet Take 1 tablet by mouth 3 times daily as needed for Pain 90 tablet 2    ELURYNG 0.12-0.015 MG/24HR vaginal ring INSERT ONE RING PER VAGINA X 3 WEEKS, OFF X 1 WEEK      BENZOYL PEROXIDE 5 % external wash Apply 1 application topically daily      clindamycin (CLINDAGEL) 1 % gel Apply 1 application topically daily       No current facility-administered medications for this visit.      Past Medical History:   Diagnosis Date    ADD (attention deficit disorder)           Subjective:  Has had a cold, stuffy, cough, sore throat, already getting better.  Allergies acting up, needs prescription for Claritin.  Great control of ADHD on Aderall bid, needs refill.        Review of Systems   HENT:  Positive for congestion and rhinorrhea.    Respiratory:  Positive for cough.    Psychiatric/Behavioral:  Positive for decreased concentration.           Objective:  /86 (BP Site: Left Upper Arm, Patient Position: Sitting, BP Cuff Size: Medium Adult)   Pulse 70   Temp 98 °F (36.7 °C)   Ht 1.626 m (5' 4\")   Wt 62.1 kg (137 lb)   LMP 02/14/2025 (Exact Date)   SpO2 98%   BMI 23.52 kg/m²      Physical Exam  Vitals reviewed.   Constitutional:       Appearance: Normal appearance.   HENT:      Head: Normocephalic.      Right Ear: Tympanic membrane and external ear normal. There is no impacted cerumen.      Left Ear: Tympanic membrane and external ear normal. There is no impacted cerumen.      Nose: Congestion present.      Mouth/Throat:      Pharynx: Oropharynx is clear. Posterior oropharyngeal erythema present. No

## 2025-04-08 ENCOUNTER — OFFICE VISIT (OUTPATIENT)
Dept: PRIMARY CARE CLINIC | Age: 40
End: 2025-04-08
Payer: COMMERCIAL

## 2025-04-08 VITALS
HEIGHT: 64 IN | DIASTOLIC BLOOD PRESSURE: 72 MMHG | BODY MASS INDEX: 23.05 KG/M2 | OXYGEN SATURATION: 99 % | WEIGHT: 135 LBS | SYSTOLIC BLOOD PRESSURE: 122 MMHG | TEMPERATURE: 98.7 F | HEART RATE: 86 BPM

## 2025-04-08 DIAGNOSIS — J30.1 SEASONAL ALLERGIC RHINITIS DUE TO POLLEN: Primary | ICD-10-CM

## 2025-04-08 DIAGNOSIS — F90.9 ATTENTION DEFICIT HYPERACTIVITY DISORDER (ADHD), UNSPECIFIED ADHD TYPE: ICD-10-CM

## 2025-04-08 PROCEDURE — 1036F TOBACCO NON-USER: CPT | Performed by: INTERNAL MEDICINE

## 2025-04-08 PROCEDURE — G8420 CALC BMI NORM PARAMETERS: HCPCS | Performed by: INTERNAL MEDICINE

## 2025-04-08 PROCEDURE — G8427 DOCREV CUR MEDS BY ELIG CLIN: HCPCS | Performed by: INTERNAL MEDICINE

## 2025-04-08 PROCEDURE — 99213 OFFICE O/P EST LOW 20 MIN: CPT | Performed by: INTERNAL MEDICINE

## 2025-04-08 RX ORDER — DEXTROAMPHETAMINE SACCHARATE, AMPHETAMINE ASPARTATE, DEXTROAMPHETAMINE SULFATE AND AMPHETAMINE SULFATE 5; 5; 5; 5 MG/1; MG/1; MG/1; MG/1
20 TABLET ORAL 2 TIMES DAILY
Qty: 60 TABLET | Refills: 0 | Status: SHIPPED | OUTPATIENT
Start: 2025-04-08 | End: 2025-05-08

## 2025-04-08 RX ORDER — IBUPROFEN 800 MG/1
800 TABLET, FILM COATED ORAL 3 TIMES DAILY PRN
Qty: 90 TABLET | Refills: 2 | Status: CANCELLED | OUTPATIENT
Start: 2025-04-08

## 2025-04-08 RX ORDER — LORATADINE 10 MG/1
10 TABLET ORAL DAILY
Qty: 90 TABLET | Refills: 0 | Status: SHIPPED | OUTPATIENT
Start: 2025-04-08

## 2025-04-08 NOTE — PROGRESS NOTES
Rachana Castañeda, was evaluated through a synchronous (real-time) audio-video encounter. The patient (or guardian if applicable) is aware that this is a billable service, which includes applicable co-pays. This Virtual Visit was conducted with patient's (and/or legal guardian's) consent. Patient identification was verified, and a caregiver was present when appropriate.   The patient was located at {[facility] - Patient:[id number]}  Provider was located at {[facility] - Provider:[id number]}  Confirm you are appropriately licensed, registered, or certified to deliver care in the state where the patient is located as indicated above. If you are not or unsure, please re-schedule the visit: {[facility] State License:[id number]}    Rachana Castañeda (:  1985) is a {New vs Established:[id number]::\"Established patient\"}, presenting virtually for evaluation of the following:      Below is the assessment and plan developed based on review of pertinent history, physical exam, labs, studies, and medications.     Assessment & Plan  Attention deficit hyperactivity disorder (ADHD), unspecified ADHD type   {A/P Summary:1613067377}           No follow-ups on file.       Subjective   HPI  Review of Systems       Objective   Patient-Reported Vitals  No data recorded     Physical Exam  {Virtual visit PE with detailed exam Optional:758636791}       {Time Documentation Optional:686270514}    --Christina Hope MD

## 2025-04-15 NOTE — PROGRESS NOTES
Rachana Castañeda presents today for follow up of ADHD, Chronic all rhinitis,     Current Outpatient Medications   Medication Sig Dispense Refill    amphetamine-dextroamphetamine (ADDERALL, 20MG,) 20 MG tablet Take 1 tablet by mouth in the morning and 1 tablet in the evening. Do all this for 30 days. 60 tablet 0    loratadine (CLARITIN) 10 MG tablet Take 1 tablet by mouth daily 90 tablet 0    ibuprofen (ADVIL;MOTRIN) 800 MG tablet Take 1 tablet by mouth 3 times daily as needed for Pain 90 tablet 2    ELURYNG 0.12-0.015 MG/24HR vaginal ring INSERT ONE RING PER VAGINA X 3 WEEKS, OFF X 1 WEEK      BENZOYL PEROXIDE 5 % external wash Apply 1 application topically daily      clindamycin (CLINDAGEL) 1 % gel Apply 1 application topically daily       No current facility-administered medications for this visit.      Past Medical History:   Diagnosis Date    ADD (attention deficit disorder)           Subjective:  AS above.Continues to take Adderall twice a day every day.  Able to focus and complete tasks without side effects.  Would like to continue on same dose.  Allergies well controlled on Claritin.        Review of Systems   Psychiatric/Behavioral:  Positive for decreased concentration.           Objective:  /72 (BP Site: Left Upper Arm, Patient Position: Sitting, BP Cuff Size: Medium Adult)   Pulse 86   Temp 98.7 °F (37.1 °C)   Ht 1.626 m (5' 4\")   Wt 61.2 kg (135 lb)   LMP 04/07/2025   SpO2 99%   BMI 23.17 kg/m²      Physical Exam  Vitals reviewed.   HENT:      Head: Normocephalic.      Right Ear: External ear normal. There is no impacted cerumen.      Left Ear: External ear normal. There is no impacted cerumen.      Nose: Nose normal.      Mouth/Throat:      Pharynx: Oropharynx is clear. No oropharyngeal exudate.   Eyes:      Extraocular Movements: Extraocular movements intact.      Conjunctiva/sclera: Conjunctivae normal.      Pupils: Pupils are equal, round, and reactive to light.   Cardiovascular:      Rate

## 2025-05-13 ENCOUNTER — OFFICE VISIT (OUTPATIENT)
Dept: PRIMARY CARE CLINIC | Age: 40
End: 2025-05-13
Payer: COMMERCIAL

## 2025-05-13 VITALS
DIASTOLIC BLOOD PRESSURE: 72 MMHG | SYSTOLIC BLOOD PRESSURE: 110 MMHG | HEART RATE: 100 BPM | WEIGHT: 132 LBS | BODY MASS INDEX: 22.53 KG/M2 | HEIGHT: 64 IN | TEMPERATURE: 98.7 F | OXYGEN SATURATION: 97 %

## 2025-05-13 DIAGNOSIS — M25.50 ARTHRALGIA, UNSPECIFIED JOINT: ICD-10-CM

## 2025-05-13 DIAGNOSIS — J30.1 SEASONAL ALLERGIC RHINITIS DUE TO POLLEN: Primary | ICD-10-CM

## 2025-05-13 DIAGNOSIS — F90.9 ATTENTION DEFICIT HYPERACTIVITY DISORDER (ADHD), UNSPECIFIED ADHD TYPE: ICD-10-CM

## 2025-05-13 PROCEDURE — 1036F TOBACCO NON-USER: CPT | Performed by: INTERNAL MEDICINE

## 2025-05-13 PROCEDURE — G8420 CALC BMI NORM PARAMETERS: HCPCS | Performed by: INTERNAL MEDICINE

## 2025-05-13 PROCEDURE — 99214 OFFICE O/P EST MOD 30 MIN: CPT | Performed by: INTERNAL MEDICINE

## 2025-05-13 PROCEDURE — G8427 DOCREV CUR MEDS BY ELIG CLIN: HCPCS | Performed by: INTERNAL MEDICINE

## 2025-05-13 RX ORDER — LORATADINE 10 MG/1
10 TABLET ORAL DAILY
Qty: 90 TABLET | Refills: 0 | Status: SHIPPED | OUTPATIENT
Start: 2025-05-13

## 2025-05-13 RX ORDER — DEXTROAMPHETAMINE SACCHARATE, AMPHETAMINE ASPARTATE, DEXTROAMPHETAMINE SULFATE AND AMPHETAMINE SULFATE 5; 5; 5; 5 MG/1; MG/1; MG/1; MG/1
20 TABLET ORAL 2 TIMES DAILY
Qty: 60 TABLET | Refills: 0 | Status: SHIPPED | OUTPATIENT
Start: 2025-05-13 | End: 2025-06-12

## 2025-05-13 RX ORDER — IBUPROFEN 800 MG/1
800 TABLET, FILM COATED ORAL 3 TIMES DAILY PRN
Qty: 90 TABLET | Refills: 2 | Status: SHIPPED | OUTPATIENT
Start: 2025-05-13

## 2025-05-14 PROBLEM — M25.50 ARTHRALGIA: Status: ACTIVE | Noted: 2025-05-14

## 2025-05-14 NOTE — PROGRESS NOTES
Rachana Castañeda presents today for follow up of ADHD, Allergies, Arthralgias    Current Outpatient Medications   Medication Sig Dispense Refill    amphetamine-dextroamphetamine (ADDERALL, 20MG,) 20 MG tablet Take 1 tablet by mouth in the morning and 1 tablet in the evening. Do all this for 30 days. 60 tablet 0    ibuprofen (ADVIL;MOTRIN) 800 MG tablet Take 1 tablet by mouth 3 times daily as needed for Pain 90 tablet 2    loratadine (CLARITIN) 10 MG tablet Take 1 tablet by mouth daily 90 tablet 0    ELURYNG 0.12-0.015 MG/24HR vaginal ring INSERT ONE RING PER VAGINA X 3 WEEKS, OFF X 1 WEEK       No current facility-administered medications for this visit.      Past Medical History:   Diagnosis Date    ADD (attention deficit disorder)           Subjective:  As above.  Continues to take Adderall 20 twice a day. No side effects.  Allows her to stay focused and compete tasks. Allergies well controlled on Claritin.  Takes Motrin 2-3 times a week due to joint pain, cleans homes for a living, work is very physical.        Review of Systems   Musculoskeletal:  Positive for arthralgias.   Psychiatric/Behavioral:  Positive for decreased concentration.           Objective:  /72 (BP Site: Left Upper Arm, Patient Position: Sitting, BP Cuff Size: Medium Adult)   Pulse 100   Temp 98.7 °F (37.1 °C)   Ht 1.626 m (5' 4\")   Wt 59.9 kg (132 lb)   LMP 04/24/2025 (Exact Date)   SpO2 97%   BMI 22.66 kg/m²      Physical Exam  Vitals reviewed.   Constitutional:       Appearance: Normal appearance.   HENT:      Head: Normocephalic.      Right Ear: External ear normal. There is no impacted cerumen.      Left Ear: External ear normal. There is no impacted cerumen.      Nose: Nose normal.      Mouth/Throat:      Pharynx: Oropharynx is clear. No oropharyngeal exudate.   Eyes:      Extraocular Movements: Extraocular movements intact.      Conjunctiva/sclera: Conjunctivae normal.      Pupils: Pupils are equal, round, and reactive to

## 2025-06-17 DIAGNOSIS — F90.9 ATTENTION DEFICIT HYPERACTIVITY DISORDER (ADHD), UNSPECIFIED ADHD TYPE: ICD-10-CM

## 2025-06-17 NOTE — TELEPHONE ENCOUNTER
I called patient to reschedule her appointment. She will need a refill on her medication to get her to her next appointment.     amphetamine-dextroamphetamine (ADDERALL, 20MG,) 20 MG tablet     [facility]/pharmacy #[id number] - [place], [place] - [address] [place] [address]

## 2025-06-17 NOTE — TELEPHONE ENCOUNTER
Patient rescheduled to establish with Dr. Gage    Due to establish with Dr. Gage on 7/18/25    Adderall last filled on 5/13/25 #60 no refills = 30 days = 6/12/25

## 2025-06-18 RX ORDER — DEXTROAMPHETAMINE SACCHARATE, AMPHETAMINE ASPARTATE, DEXTROAMPHETAMINE SULFATE AND AMPHETAMINE SULFATE 5; 5; 5; 5 MG/1; MG/1; MG/1; MG/1
20 TABLET ORAL 2 TIMES DAILY
Qty: 60 TABLET | Refills: 0 | Status: SHIPPED | OUTPATIENT
Start: 2025-06-18 | End: 2025-07-19

## 2025-07-29 ENCOUNTER — OFFICE VISIT (OUTPATIENT)
Dept: PRIMARY CARE CLINIC | Age: 40
End: 2025-07-29
Payer: COMMERCIAL

## 2025-07-29 VITALS
OXYGEN SATURATION: 98 % | HEART RATE: 105 BPM | WEIGHT: 134 LBS | TEMPERATURE: 97.6 F | DIASTOLIC BLOOD PRESSURE: 85 MMHG | BODY MASS INDEX: 23 KG/M2 | SYSTOLIC BLOOD PRESSURE: 122 MMHG | RESPIRATION RATE: 16 BRPM

## 2025-07-29 DIAGNOSIS — F90.9 ATTENTION DEFICIT HYPERACTIVITY DISORDER (ADHD), UNSPECIFIED ADHD TYPE: ICD-10-CM

## 2025-07-29 DIAGNOSIS — S91.109A OPEN WOUND OF TOE, INITIAL ENCOUNTER: Primary | ICD-10-CM

## 2025-07-29 PROCEDURE — 1036F TOBACCO NON-USER: CPT | Performed by: INTERNAL MEDICINE

## 2025-07-29 PROCEDURE — G8427 DOCREV CUR MEDS BY ELIG CLIN: HCPCS | Performed by: INTERNAL MEDICINE

## 2025-07-29 PROCEDURE — G8420 CALC BMI NORM PARAMETERS: HCPCS | Performed by: INTERNAL MEDICINE

## 2025-07-29 PROCEDURE — 99213 OFFICE O/P EST LOW 20 MIN: CPT | Performed by: INTERNAL MEDICINE

## 2025-07-29 RX ORDER — DEXTROAMPHETAMINE SACCHARATE, AMPHETAMINE ASPARTATE, DEXTROAMPHETAMINE SULFATE AND AMPHETAMINE SULFATE 5; 5; 5; 5 MG/1; MG/1; MG/1; MG/1
20 TABLET ORAL 2 TIMES DAILY
Qty: 60 TABLET | Refills: 0 | Status: SHIPPED | OUTPATIENT
Start: 2025-07-29 | End: 2025-08-29

## 2025-07-29 RX ORDER — LORATADINE 10 MG/1
10 TABLET ORAL DAILY
Qty: 90 TABLET | Refills: 1 | Status: SHIPPED | OUTPATIENT
Start: 2025-07-29

## 2025-07-29 RX ORDER — IBUPROFEN 800 MG/1
800 TABLET, FILM COATED ORAL 3 TIMES DAILY PRN
Qty: 90 TABLET | Refills: 2 | Status: CANCELLED | OUTPATIENT
Start: 2025-07-29

## 2025-07-29 ASSESSMENT — ENCOUNTER SYMPTOMS
TROUBLE SWALLOWING: 0
ABDOMINAL PAIN: 0
VOMITING: 0
BLOOD IN STOOL: 0
SORE THROAT: 0
SINUS PRESSURE: 0
NAUSEA: 0
DIARRHEA: 0
SHORTNESS OF BREATH: 0
BACK PAIN: 0
CONSTIPATION: 1

## 2025-07-29 NOTE — PROGRESS NOTES
[facility]      SUBJECTIVE:  Rachana Castañeda (:  1985) is a 39 y.o. female here for evaluation of the following chief complaint(s):  Established New Doctor and Toe Injury (Right foot big toe and toe next to it, not healing)    Assessment & Plan  1. Toe injury.  - The toe injury does not appear to be infected but requires careful attention.  - Advised to clean the wound with soap and water daily, apply Aquaphor, and cover it with a Band-Aid. Peroxide use was discouraged due to its potential to damage healthy tissue.  - Discontinue the use of triple antibiotic ointment as the wound does not seem infected. A photograph of the wound was taken for documentation purposes.  - If the condition worsens over the next 2 to 3 weeks, she should inform us so that a referral to wound care can be considered.    2. Attention deficit hyperactivity disorder (ADHD).  - A formal assessment for ADHD will be conducted to ensure the appropriateness of her current medication regimen.  - A list of potential assessment centers was provided for her to choose from.  - Once the assessment is completed and results are available, refills for her medications will be provided. Current prescriptions will be honored until the assessment is completed.  - Refills for Adderall will be sent to the pharmacy.    3. Ankle pain.  - Reports torn ligaments in her ankle and occasional pain, especially with weather changes.  - Managing the pain with Motrin as needed.  - No further intervention is required at this time as she is not experiencing daily pain and it does not interfere with her activities.  - No surgery is planned for now.    4. Medication management.  - Currently on Claritin once a day for allergies, ibuprofen as needed for pain, a vaginal ring for contraception, and Adderall 20 mg twice a day for ADHD.  - Refills for Claritin and Adderall will be sent to the pharmacy.  - Follow-up: A